# Patient Record
Sex: FEMALE | Race: WHITE | Employment: OTHER | URBAN - METROPOLITAN AREA
[De-identification: names, ages, dates, MRNs, and addresses within clinical notes are randomized per-mention and may not be internally consistent; named-entity substitution may affect disease eponyms.]

---

## 2020-03-13 RX ORDER — CLONAZEPAM 0.5 MG/1
0.25 TABLET ORAL
COMMUNITY

## 2020-03-13 RX ORDER — BUPROPION HYDROCHLORIDE 150 MG/1
150 TABLET ORAL DAILY
COMMUNITY

## 2020-03-13 RX ORDER — ESCITALOPRAM OXALATE 10 MG/1
10 TABLET ORAL DAILY
COMMUNITY

## 2020-03-14 NOTE — H&P
CARE TEAM:  Patient Care Team:  Pramod Garvin MD as PCP - General (Family Medicine)     ASSESSMENT:  1. Closed fracture of olecranon process of left ulna, initial encounter          There is no problem list on file for this patient.       PLAN:  Treatment Plan:  She needs operative fixation. I have recommended a splint in the interim which she has refused. We discussed reasonable expectations. We discussed usual postoperative course. Regional anesthesia.         Orders Placed This Encounter    X-ray elbow left 3+ views ([id number])      Follow-up: Return if symptoms worsen or fail to improve, for first postoperative visit.      HISTORY OF PRESENT ILLNESS:  Chief Complaint: Follow-up of the Left Elbow     Age: 82 y.o. Sex: female   History of present illness: Se Ho  is a pleasant 82 y.o. female who presents today for evaluation of her left elbow. She reports a fall February 20th. Landed onto her elbow. Pain and swelling. She was seen by her primary care physician. X-rays were obtained. She was told she had a fracture. She was not immobilized. She was then seen at ortho on-call February 27th. She was put into a sling. She reports some persistent pain. Some weakness in the arm.     Past medical history, past surgical history, medications, allergies, social history, and review of systems have been reviewed by me.     Review of Systems   3/3/2020    Constitutional: Unexplained: Negative  Genitourinary: Frequent Urination: Negative  HEENT: Vision Loss: Negative  Neurological: Memory Loss: Negative  Integumentary: Rash: Negative  Cardiovascular: Palpatations: Negative  Hematologic: Bruises/Bleeds Easily: Negative  Gastrointestinal: Constipation: Negative  Immunological: Seasonal Allergies: Negative  Musculoskeletal: Joint Pain: Positive        OBJECTIVE:  Constitutional:  No acute distress. Pleasant and cooperative with exam.  HEENT: Normocephalic.  Atraumatic. Eyes are clear  Hearing intact to spoken word   Respiratory:  Breathing unlabored. .  Cardiovascular:  No marked edema. Psychiatric: Alert.  Affect appropriate. Gait: Normal.  Musculoskeletal    Skin is intact. She has significant ecchymosis in the elbow tracking down into the hand. She has surprisingly good elbow motion actively  degrees. Full forearm rotation. intact sensibility     IMAGING / STUDIES:   Order: XR ELBOW 3+ VW LEFT - Indication: Closed fracture of olecranon   process of left ulna, initial encounter        X-ray Elbow Left 3+ Views ([id number])     Result Date: 3/3/2020  AP, Lat, Oblique.      Impression: I ordered and interpreted three views of the elbow. These demonstrate widely displaced olecranon fracture            I independently reviewed x-ray images of the elbow.   She has a displaced olecranon fracture

## 2020-03-16 ENCOUNTER — ANESTHESIA (OUTPATIENT)
Dept: MEDSURG UNIT | Age: 85
End: 2020-03-16
Payer: MEDICARE

## 2020-03-16 ENCOUNTER — HOSPITAL ENCOUNTER (OUTPATIENT)
Age: 85
Setting detail: OUTPATIENT SURGERY
Discharge: HOME | End: 2020-03-16
Attending: ORTHOPAEDIC SURGERY | Admitting: ORTHOPAEDIC SURGERY
Payer: MEDICARE

## 2020-03-16 ENCOUNTER — ANESTHESIA EVENT (OUTPATIENT)
Dept: MEDSURG UNIT | Age: 85
End: 2020-03-16
Payer: MEDICARE

## 2020-03-16 VITALS
HEIGHT: 62 IN | HEART RATE: 64 BPM | BODY MASS INDEX: 20.61 KG/M2 | SYSTOLIC BLOOD PRESSURE: 135 MMHG | OXYGEN SATURATION: 93 % | TEMPERATURE: 97.5 F | WEIGHT: 112 LBS | DIASTOLIC BLOOD PRESSURE: 56 MMHG | RESPIRATION RATE: 16 BRPM

## 2020-03-16 DIAGNOSIS — S52.022A CLOSED FRACTURE OF OLECRANON PROCESS OF LEFT ULNA, INITIAL ENCOUNTER: Primary | ICD-10-CM

## 2020-03-16 PROCEDURE — 77030031139 HC SUT VCRL2 J&J -A: Performed by: ORTHOPAEDIC SURGERY

## 2020-03-16 PROCEDURE — 74011250636 HC RX REV CODE- 250/636: Performed by: NURSE ANESTHETIST, CERTIFIED REGISTERED

## 2020-03-16 PROCEDURE — 76210000034 HC AMBSU PH I REC 0.5 TO 1 HR: Performed by: ORTHOPAEDIC SURGERY

## 2020-03-16 PROCEDURE — 77030040356 HC CORD BPLR FRCP COVD -A: Performed by: ORTHOPAEDIC SURGERY

## 2020-03-16 PROCEDURE — 77030006689 HC BLD OPHTH BVR BD -A: Performed by: ORTHOPAEDIC SURGERY

## 2020-03-16 PROCEDURE — 77030020754 HC CUF TRNQT 2BLA STRY -B: Performed by: ORTHOPAEDIC SURGERY

## 2020-03-16 PROCEDURE — C1713 ANCHOR/SCREW BN/BN,TIS/BN: HCPCS | Performed by: ORTHOPAEDIC SURGERY

## 2020-03-16 PROCEDURE — C1769 GUIDE WIRE: HCPCS | Performed by: ORTHOPAEDIC SURGERY

## 2020-03-16 PROCEDURE — 77030002916 HC SUT ETHLN J&J -A: Performed by: ORTHOPAEDIC SURGERY

## 2020-03-16 PROCEDURE — 76060000061 HC AMB SURG ANES 0.5 TO 1 HR: Performed by: ORTHOPAEDIC SURGERY

## 2020-03-16 PROCEDURE — 74011250636 HC RX REV CODE- 250/636: Performed by: ANESTHESIOLOGY

## 2020-03-16 PROCEDURE — 74011250636 HC RX REV CODE- 250/636: Performed by: ORTHOPAEDIC SURGERY

## 2020-03-16 PROCEDURE — 77030039497 HC CST PAD STERILE CHCS -A: Performed by: ORTHOPAEDIC SURGERY

## 2020-03-16 PROCEDURE — 77030018836 HC SOL IRR NACL ICUM -A: Performed by: ORTHOPAEDIC SURGERY

## 2020-03-16 PROCEDURE — 77030040922 HC BLNKT HYPOTHRM STRY -A

## 2020-03-16 PROCEDURE — 77030040361 HC SLV COMPR DVT MDII -B: Performed by: ORTHOPAEDIC SURGERY

## 2020-03-16 PROCEDURE — 77030003737 HC BIT DRL ACMD -C: Performed by: ORTHOPAEDIC SURGERY

## 2020-03-16 PROCEDURE — 76030000000 HC AMB SURG OR TIME 0.5 TO 1: Performed by: ORTHOPAEDIC SURGERY

## 2020-03-16 DEVICE — OLECRANON PLATE, EXTENDED 5-HOLE LT
Type: IMPLANTABLE DEVICE | Site: ELBOW | Status: FUNCTIONAL
Brand: ACUMED

## 2020-03-16 DEVICE — 2.7MM X 16MM LOCKING HEXALOBE SCREW
Type: IMPLANTABLE DEVICE | Site: ELBOW | Status: FUNCTIONAL
Brand: ACUMED

## 2020-03-16 DEVICE — 3.5MM X 14MM NON-LOCKING HEXALOBE SCREW
Type: IMPLANTABLE DEVICE | Site: ELBOW | Status: FUNCTIONAL
Brand: ACUMED

## 2020-03-16 DEVICE — 3.5MM X 20MM LOCKING HEXALOBE SCREW
Type: IMPLANTABLE DEVICE | Site: ELBOW | Status: FUNCTIONAL
Brand: ACUMED

## 2020-03-16 DEVICE — 3.5MM X 14MM LOCKING HEXALOBE SCREW
Type: IMPLANTABLE DEVICE | Site: ELBOW | Status: FUNCTIONAL
Brand: ACUMED

## 2020-03-16 DEVICE — 3.5MM X 12MM LOCKING HEXALOBE SCREW
Type: IMPLANTABLE DEVICE | Site: ELBOW | Status: FUNCTIONAL
Brand: ACUMED

## 2020-03-16 DEVICE — 2.7MM X 20MM LOCKING HEXALOBE SCREW
Type: IMPLANTABLE DEVICE | Site: ELBOW | Status: FUNCTIONAL
Brand: ACUMED

## 2020-03-16 DEVICE — 3.5MM X 40MM LOCKING HEXALOBE SCREW
Type: IMPLANTABLE DEVICE | Site: ELBOW | Status: FUNCTIONAL
Brand: ACUMED

## 2020-03-16 DEVICE — 3.5MM X 20MM NON-LOCKING HEXALOBE SCREW
Type: IMPLANTABLE DEVICE | Site: ELBOW | Status: FUNCTIONAL
Brand: ACUMED

## 2020-03-16 RX ORDER — ROPIVACAINE HYDROCHLORIDE 5 MG/ML
INJECTION, SOLUTION EPIDURAL; INFILTRATION; PERINEURAL
Status: COMPLETED | OUTPATIENT
Start: 2020-03-16 | End: 2020-03-16

## 2020-03-16 RX ORDER — FENTANYL CITRATE 50 UG/ML
25 INJECTION, SOLUTION INTRAMUSCULAR; INTRAVENOUS
Status: DISCONTINUED | OUTPATIENT
Start: 2020-03-16 | End: 2020-03-18 | Stop reason: HOSPADM

## 2020-03-16 RX ORDER — SODIUM CHLORIDE 0.9 % (FLUSH) 0.9 %
5-40 SYRINGE (ML) INJECTION AS NEEDED
Status: DISCONTINUED | OUTPATIENT
Start: 2020-03-16 | End: 2020-03-16 | Stop reason: HOSPADM

## 2020-03-16 RX ORDER — SODIUM CHLORIDE 0.9 % (FLUSH) 0.9 %
5-40 SYRINGE (ML) INJECTION EVERY 8 HOURS
Status: DISCONTINUED | OUTPATIENT
Start: 2020-03-16 | End: 2020-03-16 | Stop reason: HOSPADM

## 2020-03-16 RX ORDER — SODIUM CHLORIDE 0.9 % (FLUSH) 0.9 %
5-40 SYRINGE (ML) INJECTION EVERY 8 HOURS
Status: DISCONTINUED | OUTPATIENT
Start: 2020-03-16 | End: 2020-03-18 | Stop reason: HOSPADM

## 2020-03-16 RX ORDER — HYDROCODONE BITARTRATE AND ACETAMINOPHEN 5; 325 MG/1; MG/1
1 TABLET ORAL
Qty: 12 TAB | Refills: 0 | Status: SHIPPED | OUTPATIENT
Start: 2020-03-16 | End: 2020-03-19

## 2020-03-16 RX ORDER — MORPHINE SULFATE 10 MG/ML
2 INJECTION, SOLUTION INTRAMUSCULAR; INTRAVENOUS
Status: DISCONTINUED | OUTPATIENT
Start: 2020-03-16 | End: 2020-03-18 | Stop reason: HOSPADM

## 2020-03-16 RX ORDER — SODIUM CHLORIDE 0.9 % (FLUSH) 0.9 %
5-40 SYRINGE (ML) INJECTION AS NEEDED
Status: DISCONTINUED | OUTPATIENT
Start: 2020-03-16 | End: 2020-03-18 | Stop reason: HOSPADM

## 2020-03-16 RX ORDER — FENTANYL CITRATE 50 UG/ML
50 INJECTION, SOLUTION INTRAMUSCULAR; INTRAVENOUS AS NEEDED
Status: DISCONTINUED | OUTPATIENT
Start: 2020-03-16 | End: 2020-03-16 | Stop reason: HOSPADM

## 2020-03-16 RX ORDER — PROPOFOL 10 MG/ML
INJECTION, EMULSION INTRAVENOUS AS NEEDED
Status: DISCONTINUED | OUTPATIENT
Start: 2020-03-16 | End: 2020-03-16 | Stop reason: HOSPADM

## 2020-03-16 RX ORDER — MIDAZOLAM HYDROCHLORIDE 1 MG/ML
1 INJECTION, SOLUTION INTRAMUSCULAR; INTRAVENOUS AS NEEDED
Status: DISCONTINUED | OUTPATIENT
Start: 2020-03-16 | End: 2020-03-16 | Stop reason: HOSPADM

## 2020-03-16 RX ORDER — SODIUM CHLORIDE, SODIUM LACTATE, POTASSIUM CHLORIDE, CALCIUM CHLORIDE 600; 310; 30; 20 MG/100ML; MG/100ML; MG/100ML; MG/100ML
INJECTION, SOLUTION INTRAVENOUS
Status: DISCONTINUED | OUTPATIENT
Start: 2020-03-16 | End: 2020-03-16 | Stop reason: HOSPADM

## 2020-03-16 RX ORDER — LIDOCAINE HYDROCHLORIDE 10 MG/ML
0.1 INJECTION, SOLUTION EPIDURAL; INFILTRATION; INTRACAUDAL; PERINEURAL AS NEEDED
Status: DISCONTINUED | OUTPATIENT
Start: 2020-03-16 | End: 2020-03-16 | Stop reason: HOSPADM

## 2020-03-16 RX ORDER — PROPOFOL 10 MG/ML
INJECTION, EMULSION INTRAVENOUS
Status: DISCONTINUED | OUTPATIENT
Start: 2020-03-16 | End: 2020-03-16 | Stop reason: HOSPADM

## 2020-03-16 RX ORDER — ROPIVACAINE HYDROCHLORIDE 5 MG/ML
30 INJECTION, SOLUTION EPIDURAL; INFILTRATION; PERINEURAL AS NEEDED
Status: DISCONTINUED | OUTPATIENT
Start: 2020-03-16 | End: 2020-03-16 | Stop reason: HOSPADM

## 2020-03-16 RX ORDER — CEFAZOLIN SODIUM/WATER 2 G/20 ML
2 SYRINGE (ML) INTRAVENOUS ONCE
Status: COMPLETED | OUTPATIENT
Start: 2020-03-16 | End: 2020-03-16

## 2020-03-16 RX ORDER — HYDROMORPHONE HYDROCHLORIDE 1 MG/ML
0.2 INJECTION, SOLUTION INTRAMUSCULAR; INTRAVENOUS; SUBCUTANEOUS
Status: DISCONTINUED | OUTPATIENT
Start: 2020-03-16 | End: 2020-03-18 | Stop reason: HOSPADM

## 2020-03-16 RX ORDER — SODIUM CHLORIDE, SODIUM LACTATE, POTASSIUM CHLORIDE, CALCIUM CHLORIDE 600; 310; 30; 20 MG/100ML; MG/100ML; MG/100ML; MG/100ML
50 INJECTION, SOLUTION INTRAVENOUS CONTINUOUS
Status: DISCONTINUED | OUTPATIENT
Start: 2020-03-16 | End: 2020-03-16 | Stop reason: HOSPADM

## 2020-03-16 RX ORDER — ONDANSETRON 2 MG/ML
4 INJECTION INTRAMUSCULAR; INTRAVENOUS AS NEEDED
Status: DISCONTINUED | OUTPATIENT
Start: 2020-03-16 | End: 2020-03-18 | Stop reason: HOSPADM

## 2020-03-16 RX ADMIN — ROPIVACAINE HYDROCHLORIDE 30 ML: 5 INJECTION, SOLUTION EPIDURAL; INFILTRATION; PERINEURAL at 12:35

## 2020-03-16 RX ADMIN — Medication 2 G: at 13:33

## 2020-03-16 RX ADMIN — SODIUM CHLORIDE, POTASSIUM CHLORIDE, SODIUM LACTATE AND CALCIUM CHLORIDE: 600; 310; 30; 20 INJECTION, SOLUTION INTRAVENOUS at 13:31

## 2020-03-16 RX ADMIN — MIDAZOLAM 2 MG: 1 INJECTION INTRAMUSCULAR; INTRAVENOUS at 12:51

## 2020-03-16 RX ADMIN — SODIUM CHLORIDE, SODIUM LACTATE, POTASSIUM CHLORIDE, AND CALCIUM CHLORIDE 50 ML/HR: 600; 310; 30; 20 INJECTION, SOLUTION INTRAVENOUS at 12:04

## 2020-03-16 RX ADMIN — PROPOFOL 25 MCG/KG/MIN: 10 INJECTION, EMULSION INTRAVENOUS at 13:37

## 2020-03-16 RX ADMIN — PROPOFOL 20 MG: 10 INJECTION, EMULSION INTRAVENOUS at 13:39

## 2020-03-16 RX ADMIN — FENTANYL CITRATE 50 MCG: 50 INJECTION INTRAMUSCULAR; INTRAVENOUS at 12:51

## 2020-03-16 RX ADMIN — PROPOFOL 20 MG: 10 INJECTION, EMULSION INTRAVENOUS at 13:37

## 2020-03-16 NOTE — ANESTHESIA PREPROCEDURE EVALUATION
Relevant Problems   No relevant active problems       Anesthetic History   No history of anesthetic complications            Review of Systems / Medical History  Patient summary reviewed, nursing notes reviewed and pertinent labs reviewed    Pulmonary  Within defined limits                 Neuro/Psych   Within defined limits           Cardiovascular                  Exercise tolerance: >4 METS     GI/Hepatic/Renal                Endo/Other  Within defined limits           Other Findings              Physical Exam    Airway  Mallampati: I           Cardiovascular    Rhythm: regular  Rate: normal         Dental  No notable dental hx       Pulmonary  Breath sounds clear to auscultation               Abdominal         Other Findings            Anesthetic Plan    ASA: 2  Anesthesia type: regional and MAC - supraclavicular block          Induction: Intravenous  Anesthetic plan and risks discussed with: Patient

## 2020-03-16 NOTE — ANESTHESIA POSTPROCEDURE EVALUATION
Post-Anesthesia Evaluation and Assessment    Patient: Carlos Alvarado MRN: [id number]  SSN: [id number]    YOB: 1935  Age: 82 y.o. Sex: female      I have evaluated the patient and they are stable and ready for discharge from the PACU. Cardiovascular Function/Vital Signs  Visit Vitals  /61   Pulse 62   Temp 36.4 °C (97.5 °F)   Resp 15   Ht 5' 2\" (1.575 m)   Wt 50.8 kg (112 lb)   SpO2 96%   BMI 20.49 kg/m²       Patient is status post Regional anesthesia for Procedure(s):  OPEN REDUCTION INTERNAL FIXATION LEFT OLECRANON FRACTURE. Nausea/Vomiting: None    Postoperative hydration reviewed and adequate. Pain:  Pain Scale 1: Numeric (0 - 10) (03/16/20 1445)  Pain Intensity 1: 0 (03/16/20 1445)   Managed    Neurological Status:   Neuro (WDL): Within Defined Limits (03/16/20 1445)   At baseline    Mental Status, Level of Consciousness: Alert and  oriented to person, place, and time    Pulmonary Status:   O2 Device: Nasal cannula (03/16/20 1445)   Adequate oxygenation and airway patent    Complications related to anesthesia: None    Post-anesthesia assessment completed. No concerns    Signed By: Elvi Dahl MD     March 16, 2020              Procedure(s):  OPEN REDUCTION INTERNAL FIXATION LEFT OLECRANON FRACTURE.    regional, MAC    <BSHSIANPOST>    Vitals Value Taken Time   /56 3/16/2020  3:00 PM   Temp 36.4 °C (97.5 °F) 3/16/2020  2:21 PM   Pulse 64 3/16/2020  3:03 PM   Resp 22 3/16/2020  3:03 PM   SpO2 95 % 3/16/2020  3:03 PM   Vitals shown include unvalidated device data.

## 2020-03-16 NOTE — ANESTHESIA PROCEDURE NOTES
Peripheral Block    Start time: 3/16/2020 12:30 PM  End time: 3/16/2020 12:40 PM  Performed by: Janeth Hernandez MD  Authorized by: Janeth Hernandez MD       Pre-procedure:    Indications: at surgeon's request    Preanesthetic Checklist: patient identified, risks and benefits discussed, site marked, timeout performed, anesthesia consent given and patient being monitored    Timeout Time: 12:30          Block Type:   Block Type:  Supraclavicular  Laterality:  Left  Monitoring:  Oxygen, responsive to questions, standard ASA monitoring, continuous pulse ox, frequent vital sign checks and heart rate  Injection Technique:  Single shot  Procedures: ultrasound guided    Patient Position: supine  Prep: chlorhexidine    Location:  Supraclavicular  Needle Type:  Stimuplex  Needle Gauge:  21 G  Needle Localization:  Ultrasound guidance    Assessment:  Number of attempts:  1  Injection Assessment:  Ultrasound image on chart, no paresthesia, incremental injection every 5 mL, negative aspiration for CSF, low pressure verified by pressure monitor, no intravascular symptoms, negative aspiration for blood and local visualized surrounding nerve on ultrasound  Patient tolerance:  Patient tolerated the procedure well with no immediate complications

## 2020-03-16 NOTE — ROUTINE PROCESS
Patient: Enrike Willson MRN: [id number]  SSN: [id number]   YOB: 1935  Age: 82 y.o. Sex: female     Patient is status post Procedure(s):  OPEN REDUCTION INTERNAL FIXATION LEFT OLECRANON FRACTURE. Surgeon(s) and Role:     * Markus Ca MD - Primary    Local/Dose/Irrigation:  SEE MAR                  Peripheral IV 03/16/20 Right Hand (Active)   Site Assessment Clean, dry, & intact 3/16/2020 12:03 PM   Phlebitis Assessment 0 3/16/2020 12:03 PM   Infiltration Assessment 0 3/16/2020 12:03 PM   Dressing Status Clean, dry, & intact 3/16/2020 12:03 PM   Dressing Type Tape;Transparent 3/16/2020 12:03 PM   Hub Color/Line Status Pink; Infusing 3/16/2020 12:03 PM   Alcohol Cap Used Yes 3/16/2020 12:03 PM                           Dressing/Packing:  Wound Elbow Left-Dressing Type: 4 x 4;ABD pad;Cast padding;Elastic bandage; Xeroform (03/16/20 1355)    Splint/Cast: Wound Elbow Left-Splint Type/Material: Splint, plaster]    Other:

## 2020-03-16 NOTE — OP NOTES
[address]   OPERATIVE REPORT    Name:  Leydi Abreu  MR#:  [id number]  :  1935  ACCOUNT #:  [de-identified]  DATE OF SERVICE:  2020      PREOPERATIVE DIAGNOSIS:  Closed displaced left olecranon fracture. POSTOPERATIVE DIAGNOSIS:  Closed displaced left olecranon fracture. PROCEDURE PERFORMED:  Open reduction and internal fixation of closed displaced left olecranon fracture. SURGEON:  Becka Camp MD    ASSISTANT:  None    ANESTHESIA:  Regional.    COMPLICATIONS:  None. SPECIMENS REMOVED:  None. IMPLANTS:  Acumed locking olecranon plate. ESTIMATED BLOOD LOSS:  Minimal.    INDICATIONS FOR THE PROCEDURE:  This is an 80-year-old female with a displaced olecranon fracture. She is indicated for operative management. We have discussed risks, benefits, potential complications and alternatives of surgery, and she has given informed consent to proceed. DESCRIPTION OF THE PROCEDURE:  The patient was identified in the preoperative holding area. Informed consent was obtained. The operative site was marked. Regional anesthesia was then established. She was then transported to the OR and placed supine on the operating table. A bump was placed under the left shoulder to allow the arm to drape across her chest.  A tourniquet was applied to the upper brachium. The left upper extremity was then sterilely prepped and draped in the usual fashion. Surgical time-out was held. The operative site was confirmed. Preop antibiotics were used. After Esmarch exsanguination, the tourniquet was elevated to 250 mmHg. A longitudinal incision was made posteriorly over the olecranon. Large skin flaps were raised. The fracture was easily identified. She was found to have a large well-organized hematoma. This was evacuated. The fracture was gently debrided with a curette. The fracture was then reduced with a pointed reduction clamp and held in place with multiple K-wires.   A locking olecranon plate was then applied posteriorly. This was held in place provisionally with a single cortical screw distally. Fluoroscopy was brought in and confirmed good reduction of the fracture and good alignment of the hardware. Multiple locking screws were placed proximally, and multiple locking and nonlocking screws were placed distally. The K-wires were removed. All other instrumentation was removed. The elbow and forearm were taken through a full range of motion without crepitus or impingement. The wound was thoroughly irrigated. Fluoroscopy confirmed acceptable reduction of the fracture and good alignment of the hardware. The fascia was closed with an 0-Vicryl over the plate. Skin was closed with 3-0 Vicryl and 4-0 nylon. Sterile dressings were applied. A well-padded, well-molded long-arm splint was applied. The tourniquet was let down and brisk cap refill returned to the digits. She was transferred to the PACU in stable condition without complication.                     Agapito Machado MD      [id number]  D:  03/16/2020 14:21  T:  03/16/2020 15:27  JOB #:  [id number]

## 2020-03-16 NOTE — BRIEF OP NOTE
BRIEF OPERATIVE NOTE    Date of Procedure: 3/16/2020   Preoperative Diagnosis: LEFT OLECRANON FRACTURE  Postoperative Diagnosis: LEFT OLECRANON FRACTURE    Procedure(s):  OPEN REDUCTION INTERNAL FIXATION LEFT OLECRANON FRACTURE  Surgeon(s) and Role:     * Court Jesus MD - Primary         Surgical Assistant: none    Surgical Staff:  Circ-1: Emely Hassan RN  Registered Nurse Assistant: Lisa Layne RN  Scrub Tech-1: Adolph Ortega  Event Time In   Incision Start 1343   Incision Close 1412     Anesthesia: Regional   Estimated Blood Loss: min  Specimens: * No specimens in log *   Findings: displaced olecranon fracture   Complications: none  Implants:   Implant Name Type Inv.  Item Serial No.  Lot No. LRB No. Used Action   PLATE OLECRN EXTD Dianelys Nathan Manpreet [id number] LT --  - SNA  PLATE OLECRN EXTD Dianelys Nathan Manpreet 1636 LT --  NA [address] [place] [address] Drive NA Left 1 Implanted   SCR BNE LCK HEXALOBE 2.7X16MM -- F/ELBOW PLT SYS - SNA  SCR BNE LCK HEXALOBE 2.7X16MM -- F/ELBOW PLT SYS NA [employer] NA Left 1 Implanted   SCR BNE LCK HEXALOBE 2.7X20MM -- F/ELBOW PLT SYS - SNA  SCR BNE LCK HEXALOBE 2.7X20MM -- F/ELBOW PLT SYS NA [employer] NA Left 1 Implanted   SCR BNE NLCK HEXALOBE 3.5X14MM -- F/ELBOW PLT SYS - SNA  SCR BNE NLCK HEXALOBE 3.5X14MM -- F/ELBOW PLT SYS NA [employer] NA Left 1 Implanted   SCR BNE NLCK HEXALOBE 3.5X20MM -- F/ELBOW PLT SYS - SNA  SCR BNE NLCK HEXALOBE 3.5X20MM -- F/ELBOW PLT SYS NA [employer] NA Left 1 Implanted   SCR BNE LCK HEXALOBE 3.5X12MM -- F/ELBOW PLT SYS - SNA  SCR BNE LCK HEXALOBE 3.5X12MM -- F/ELBOW PLT SYS NA [employer] NA Left 1 Implanted   SCR BNE LCK HEXALOBE 3.5X14MM -- F/ELBOW PLT SYS - SNA  SCR BNE LCK HEXALOBE 3.5X14MM -- F/ELBOW PLT SYS NA [employer] NA Left 1 Implanted   SCR BNE LCK HEXALOBE 3.5X20MM -- F/ELBOW PLT SYS - SNA  SCR BNE LCK HEXALOBE 3.5X20MM -- F/ELBOW PLT SYS NA [employer] NA Left 1 Implanted   SCR BNE LCK HEXALOBE 3.5X40MM -- F/ELBOW PLT SYS - SNA  SCR BNE LCK HEXALOBE 3.5X40MM -- F/ELBOW PLT SYS NA [facility] NA Left 1 Implanted

## 2022-06-10 ENCOUNTER — OFFICE VISIT (OUTPATIENT)
Dept: ORTHOPEDIC SURGERY | Age: 87
End: 2022-06-10
Payer: MEDICARE

## 2022-06-10 VITALS — BODY MASS INDEX: 21.53 KG/M2 | HEIGHT: 62 IN | WEIGHT: 117 LBS

## 2022-06-10 DIAGNOSIS — M79.672 PAIN OF LEFT HEEL: ICD-10-CM

## 2022-06-10 DIAGNOSIS — M72.2 PLANTAR FASCIITIS OF LEFT FOOT: Primary | ICD-10-CM

## 2022-06-10 PROCEDURE — 1101F PT FALLS ASSESS-DOCD LE1/YR: CPT | Performed by: ORTHOPAEDIC SURGERY

## 2022-06-10 PROCEDURE — 1123F ACP DISCUSS/DSCN MKR DOCD: CPT | Performed by: ORTHOPAEDIC SURGERY

## 2022-06-10 PROCEDURE — G8400 PT W/DXA NO RESULTS DOC: HCPCS | Performed by: ORTHOPAEDIC SURGERY

## 2022-06-10 PROCEDURE — 99202 OFFICE O/P NEW SF 15 MIN: CPT | Performed by: ORTHOPAEDIC SURGERY

## 2022-06-10 PROCEDURE — G8432 DEP SCR NOT DOC, RNG: HCPCS | Performed by: ORTHOPAEDIC SURGERY

## 2022-06-10 PROCEDURE — G8427 DOCREV CUR MEDS BY ELIG CLIN: HCPCS | Performed by: ORTHOPAEDIC SURGERY

## 2022-06-10 PROCEDURE — G8536 NO DOC ELDER MAL SCRN: HCPCS | Performed by: ORTHOPAEDIC SURGERY

## 2022-06-10 PROCEDURE — G8420 CALC BMI NORM PARAMETERS: HCPCS | Performed by: ORTHOPAEDIC SURGERY

## 2022-06-10 PROCEDURE — 1090F PRES/ABSN URINE INCON ASSESS: CPT | Performed by: ORTHOPAEDIC SURGERY

## 2022-06-10 RX ORDER — METHYLPREDNISOLONE 4 MG/1
TABLET ORAL
Qty: 1 DOSE PACK | Refills: 0 | Status: SHIPPED | OUTPATIENT
Start: 2022-06-10 | End: 2022-06-22

## 2022-06-10 NOTE — LETTER
6/13/2022    Patient: Mark Anthony Code   YOB: 1935   Date of Visit: 6/10/2022     Delano Grant MD  [address] [address]  Via Fax: [phone]    Dear Delano Grant MD,      Thank you for referring Ms. Jonna Berry to [facility] for evaluation. My notes for this consultation are attached. If you have questions, please do not hesitate to call me. I look forward to following your patient along with you.       Sincerely,    Michaela Stahl MD

## 2022-06-10 NOTE — PROGRESS NOTES
Rena Joseph (: 1935) is a 82 y.o. female, patient,here for evaluation of the following   Chief Complaint   Patient presents with    Foot Pain     left foot heel pain        ASSESSMENT/PLAN:  Below is the assessment and plan developed based on review of pertinent history, physical exam, labs, studies, and medications. 1. Plantar fasciitis of left foot  2. Pain of left heel  -     XR FOOT LT MIN 3 V; Future    Examination consistent with left Planter fasciitis, we discussed treatment for this condition. We will start with stretching program as demonstrated today in clinic, provided handout of information about types of exercises to do and how to do them correctly. Recommended physical therapy but patient wants to do on her own. I provided information about types of shoes and insoles to try, advised about appropriate shoe wear which can make a difference in terms of coverage especially if she has to stand and walk often. She did elect to proceed with anti-inflammatory medication treatment with Medrol pack which was sent to her pharmacy. No current surgical indications. Return if symptoms worsen or fail to improve. No Known Allergies    Current Outpatient Medications   Medication Sig    methylPREDNISolone (MEDROL DOSEPACK) 4 mg tablet Take 1 Tablet by mouth Specific Days and Specific Times for 6 days, THEN 1 Tablet Specific Days and Specific Times for 6 days. As instructed on packet    escitalopram oxalate (LEXAPRO) 10 mg tablet Take 10 mg by mouth daily.  buPROPion XL (WELLBUTRIN XL) 150 mg tablet Take 150 mg by mouth daily.  clonazePAM (KlonoPIN) 0.5 mg tablet Take 0.25 mg by mouth nightly. No current facility-administered medications for this visit. Past Medical History:   Diagnosis Date    Fracture of left olecranon process 2020       No past surgical history on file. No family history on file.     Social History     Socioeconomic History    Marital status:      Spouse name: Not on file    Number of children: Not on file    Years of education: Not on file    Highest education level: Not on file   Occupational History    Not on file   Tobacco Use    Smoking status: Never Smoker    Smokeless tobacco: Never Used   Substance and Sexual Activity    Alcohol use: Yes     Alcohol/week: 14.0 standard drinks     Types: 14 Glasses of wine per week    Drug use: Never    Sexual activity: Not on file   Other Topics Concern    Not on file   Social History Narrative    Not on file     Social Determinants of Health     Financial Resource Strain:     Difficulty of Paying Living Expenses: Not on file   Food Insecurity:     Worried About Running Out of Food in the Last Year: Not on file    Kadeem of Food in the Last Year: Not on file   Transportation Needs:     Lack of Transportation (Medical): Not on file    Lack of Transportation (Non-Medical):  Not on file   Physical Activity:     Days of Exercise per Week: Not on file    Minutes of Exercise per Session: Not on file   Stress:     Feeling of Stress : Not on file   Social Connections:     Frequency of Communication with Friends and Family: Not on file    Frequency of Social Gatherings with Friends and Family: Not on file    Attends Jainism Services: Not on file    Active Member of [address] [place] [address] [employer] or Organizations: Not on file    Attends Club or Organization Meetings: Not on file    Marital Status: Not on file   Intimate Partner Violence:     Fear of Current or Ex-Partner: Not on file    Emotionally Abused: Not on file    Physically Abused: Not on file    Sexually Abused: Not on file   Housing Stability:     Unable to Pay for Housing in the Last Year: Not on file    Number of [place] in the Last Year: Not on file    Unstable Housing in the Last Year: Not on file           Vitals:  Ht 5' 2\" (1.575 m)   Wt 117 lb (53.1 kg)   BMI 21.40 kg/m²    Body mass index is 21.4 kg/m².            SUBJECTIVE/OBJECTIVE:  Joslyn Mcmahon (: 1935)   Patient presents today with complaint of left heel pain present for about 2 months now, and pain is severe stabbing pain that appears constant although worse with first step getting out of bed or from his seat for after being seated for some time. Maybe a little bit of swelling to the area at times but cannot really tell. Symptoms unchanged has not tried any significant treatments. Not diabetic, non-smoker. Physical Exam  Pleasant well-nourished female , alert and oriented to person, time and place, no acute distress. Mild antalgic gait, satisfactory weightbearing stance. Left Lower Extremity: FROM actively of toes, foot, ankle, knee and hip. No instability of foot or ankle with drawer and stress. 55 strength to TAEHLGSCPeronealsPTib. No skin lesions. TTP at the medial calcaneal insertion of the plantar fascia. This spot is very painful. Silverskoid exam is Positive for gastric tightness. Calcaneal squeeze test, negative Tinel's along tarsal tunnel. Contralateral foot and ankle exam, nontender, no swelling ligaments grossly stable. Normal weightbearing stance. Neurovascular exam intact for light touch sensation, cap refill, dorsalis pedis pulse palpable, flexion/extension strength 5/5. Skin intact without open wounds, lesions or ulcers, no skin discolorations, normal warmth to skin. Imaging:    XR Results (most recent):  Results from Appointment encounter on 06/10/22    XR FOOT LT MIN 3 V    Narrative  Left foot AP, lateral and oblique nonweightbearing x-rays show no acute fractures or dislocations, there is decreased bone density but no soft tissue swelling. No significant bone spurs. An electronic signature was used to authenticate this note.   -- Aleene Apgar, MD

## 2022-06-27 ENCOUNTER — TELEPHONE (OUTPATIENT)
Dept: ORTHOPEDIC SURGERY | Age: 87
End: 2022-06-27

## 2022-06-27 DIAGNOSIS — M79.672 PAIN OF LEFT HEEL: ICD-10-CM

## 2022-06-27 DIAGNOSIS — M72.2 PLANTAR FASCIITIS OF LEFT FOOT: Primary | ICD-10-CM

## 2022-07-06 ENCOUNTER — TELEPHONE (OUTPATIENT)
Dept: ORTHOPEDIC SURGERY | Age: 87
End: 2022-07-06

## 2022-07-06 NOTE — TELEPHONE ENCOUNTER
Pt called for Physical therapy request advised Dr. Alicia Martinez is  OOO and I will call her back when she returns with the information.  Called back 7/7 advised PT request has been submitted

## 2022-07-07 DIAGNOSIS — M72.2 PLANTAR FASCIITIS OF LEFT FOOT: Primary | ICD-10-CM

## 2022-07-14 ENCOUNTER — OFFICE VISIT (OUTPATIENT)
Dept: ORTHOPEDIC SURGERY | Age: 87
End: 2022-07-14
Payer: MEDICARE

## 2022-07-14 DIAGNOSIS — M72.2 PLANTAR FASCIITIS OF LEFT FOOT: ICD-10-CM

## 2022-07-14 DIAGNOSIS — M79.672 PAIN OF LEFT HEEL: Primary | ICD-10-CM

## 2022-07-14 PROCEDURE — 97140 MANUAL THERAPY 1/> REGIONS: CPT | Performed by: PHYSICAL THERAPIST

## 2022-07-14 PROCEDURE — 97161 PT EVAL LOW COMPLEX 20 MIN: CPT | Performed by: PHYSICAL THERAPIST

## 2022-07-14 NOTE — PROGRESS NOTES
Patient Initial Evaluation    Patient Name: Lisa Malhotra  Date:2022  : 1935  [x]  Patient  Verified  Payor: [place] MEDICARE / Plan: [place] MEDICARE PART A & B / Product Type: Medicare /    Total Treatment Time (min): 40    Treatment Area: L foot    HPI    The patient is a 82-year-old female referred to physical therapy by Dr. ISBELL [facility] with a diagnosis of left plantar fasciitis. The patient states that she has had left foot and heel pain for the last year. It has significantly worsened within the last few months. Patient states that her primary limitation is that it is very painful to walk. The only tolerable shoes to wear are her sandals. She is not wearing any inserts. She states that all her shoes are too painful on her heel. She states that she just finished taking a Medrol Dosepak and it did not alleviate any of her symptoms. She has not been doing any formal stretches or exercises. Patient's primary goal is to alleviate heel pain when walking. OBJECTIVE    Gait: Patient ambulates with antalgic gait pattern with decreased left heel strike at initial stance of gait. Range of motion: R ankle WNL; L ankle active DF- neutral, passive-5, L ankle active PF- 25, passive- 30; active Ev- 10, active inv- 15    Strength: R LE WNL, L ankle grossly 4/5 except L PF 3+/5 and painful     Palpation: Tenderness with palpation to left calcaneal tuberosity and left distal achilles tendon    Soft tissue: Decreased left gastroc muscle length    Pain: Reported a visual analog scale 0/10 at rest    Swelling: moderate of L ankle    Special tests: Bernabe (+)    Treatment:    Initial evaluation completed. Manual (minutes: 15): Soft tissue mobilization of L Achilles and plantar fascia. Grade 3 midfoot mobilizations. Grade 3L posterior talocrural mobilization. Manual L gastroc stretch. We discussed at length progressive plan of care and goals with the patient to develop current plan of care.  We initiated ROM and gentle stretching and strengthening interventions. Home exercise program included seated L gastroc stretch, ankle pumps, ankle circles, plantar fascia stretch with water bottle, and standing gastroc stretch. We recommended utilization of ice for pain relief at home. Written and pictorial exercises were provided to the patient. Total treatment time 40 min. ASSESSMENT    Physical examination reveals L ankle AROM and PROM limitations, talocrural and midfoot hypomobility, decreased L ankle PF strength, tenderness to palpation to L calcaneal tuberosity, and decreased functional mobility. Patient will benefit from skilled therapy to address his limitations. Long-term goal 4 weeks  1. Patient will ambulate with nonantalgic gait pattern with equal step lengths and demonstrate left heel strike at initial contact. 2.  The patient will demonstrate L active ankle DF to 20°. 3.  The patient will walk for 10 minutes without reproduction of left heel pain. 4.  15 point improvement in FAAM.    Short-term goal 1 weeks  1. Independent demonstration of a home exercise program to facilitate recovery. Total treatment time today 40 min. ICD-10-CM ICD-9-CM    1. Pain of left heel  M79.672 729.5    2. Plantar fasciitis of left foot  M72.2 728.71      PLAN OF CARE:    Treatment frequency 1X weekly. Duration 20 visits. Focus of therapy will be on progressive restoration of range of motion and strength, balance, and functional mobility. Therapeutic applications will include but are not limited to:  Home exercise program development and implementation with updating as needed. Intramuscular dry needling to the involved region. Manual therapy, joint mobilization, myofascial release, therapeutic exercises. Modalities including ultrasound and electric stimulation heat and ice. Kinesiotape and Laura taping for joint reeducation and approximation of tissue for neuromuscular reeducation.

## 2022-07-15 ENCOUNTER — OFFICE VISIT (OUTPATIENT)
Dept: ORTHOPEDIC SURGERY | Age: 87
End: 2022-07-15
Payer: MEDICARE

## 2022-07-15 VITALS — HEIGHT: 62 IN | BODY MASS INDEX: 21.53 KG/M2 | WEIGHT: 117 LBS

## 2022-07-15 DIAGNOSIS — M72.2 PLANTAR FASCIITIS OF LEFT FOOT: Primary | ICD-10-CM

## 2022-07-15 PROCEDURE — G8400 PT W/DXA NO RESULTS DOC: HCPCS | Performed by: ORTHOPAEDIC SURGERY

## 2022-07-15 PROCEDURE — 1123F ACP DISCUSS/DSCN MKR DOCD: CPT | Performed by: ORTHOPAEDIC SURGERY

## 2022-07-15 PROCEDURE — G8427 DOCREV CUR MEDS BY ELIG CLIN: HCPCS | Performed by: ORTHOPAEDIC SURGERY

## 2022-07-15 PROCEDURE — G8420 CALC BMI NORM PARAMETERS: HCPCS | Performed by: ORTHOPAEDIC SURGERY

## 2022-07-15 PROCEDURE — 1090F PRES/ABSN URINE INCON ASSESS: CPT | Performed by: ORTHOPAEDIC SURGERY

## 2022-07-15 PROCEDURE — G8536 NO DOC ELDER MAL SCRN: HCPCS | Performed by: ORTHOPAEDIC SURGERY

## 2022-07-15 PROCEDURE — G8432 DEP SCR NOT DOC, RNG: HCPCS | Performed by: ORTHOPAEDIC SURGERY

## 2022-07-15 PROCEDURE — 1101F PT FALLS ASSESS-DOCD LE1/YR: CPT | Performed by: ORTHOPAEDIC SURGERY

## 2022-07-15 PROCEDURE — 99212 OFFICE O/P EST SF 10 MIN: CPT | Performed by: ORTHOPAEDIC SURGERY

## 2022-07-15 NOTE — LETTER
7/18/2022    Patient: Tamia Rao   YOB: 1935   Date of Visit: 7/15/2022     Henrik Pierre MD  [address] [facility] [address]  Via Fax: [phone]    Dear Henrik Pierre MD,      Thank you for referring Ms. Isidoro Elizabeth to [facility] for evaluation. My notes for this consultation are attached. If you have questions, please do not hesitate to call me. I look forward to following your patient along with you.       Sincerely,    Miguel Martini MD

## 2022-07-18 NOTE — PROGRESS NOTES
Karine Batres (: 1935) is a 82 y.o. female, patient,here for evaluation of the following   Chief Complaint   Patient presents with    Foot Pain     left heel        ASSESSMENT/PLAN:  Below is the assessment and plan developed based on review of pertinent history, physical exam, labs, studies, and medications. 1. Plantar fasciitis of left foot    Patient is going through physical therapy and I recommend she maximize that treatment. Again reminded of appropriate shoe wear and insoles and to continue home program as previously demonstrated. No current surgical indications. Return if symptoms worsen or fail to improve. No Known Allergies    Current Outpatient Medications   Medication Sig    escitalopram oxalate (LEXAPRO) 10 mg tablet Take 10 mg by mouth daily.  buPROPion XL (WELLBUTRIN XL) 150 mg tablet Take 150 mg by mouth daily.  clonazePAM (KlonoPIN) 0.5 mg tablet Take 0.25 mg by mouth nightly. No current facility-administered medications for this visit. Past Medical History:   Diagnosis Date    Fracture of left olecranon process 2020       No past surgical history on file. No family history on file. Social History     Socioeconomic History    Marital status:      Spouse name: Not on file    Number of children: Not on file    Years of education: Not on file    Highest education level: Not on file   Occupational History    Not on file   Tobacco Use    Smoking status: Never Smoker    Smokeless tobacco: Never Used   Substance and Sexual Activity    Alcohol use:  Yes     Alcohol/week: 14.0 standard drinks     Types: 14 Glasses of wine per week    Drug use: Never    Sexual activity: Not on file   Other Topics Concern    Not on file   Social History Narrative    Not on file     Social Determinants of Health     Financial Resource Strain:     Difficulty of Paying Living Expenses: Not on file   Food Insecurity:     Worried About [address] in the Last Year: Not on file    Ran Out of Food in the Last Year: Not on file   Transportation Needs:     Lack of Transportation (Medical): Not on file    Lack of Transportation (Non-Medical): Not on file   Physical Activity:     Days of Exercise per Week: Not on file    Minutes of Exercise per Session: Not on file   Stress:     Feeling of Stress : Not on file   Social Connections:     Frequency of Communication with Friends and Family: Not on file    Frequency of Social Gatherings with Friends and Family: Not on file    Attends Orthodox Services: Not on file    Active Member of [employer] or Organizations: Not on file    Attends Club or Organization Meetings: Not on file    Marital Status: Not on file   Intimate Partner Violence:     Fear of Current or Ex-Partner: Not on file    Emotionally Abused: Not on file    Physically Abused: Not on file    Sexually Abused: Not on file   Housing Stability:     Unable to Pay for Housing in the Last Year: Not on file    Number of [place] in the Last Year: Not on file    Unstable Housing in the Last Year: Not on file           Vitals:  Ht 5' 2\" (1.575 m)   Wt 117 lb (53.1 kg)   BMI 21.40 kg/m²    Body mass index is 21.4 kg/m². ROS     Positive for: Musculoskeletal    Last edited by Sully Maldonado on 7/15/2022  1:35 PM. (History)              SUBJECTIVE/OBJECTIVE:  Gerhardt Freed (: 1935)   Patient back for reevaluation left Planter fasciitis. She is going to physical therapy and just completed therapy a few minutes ago. She has no other complaints, feels she is benefiting from the therapy program and feels a lot better. Physical Exam  Pleasant well-nourished female , alert and oriented to person, time and place, no acute distress. Mostly normal gait, satisfactory weightbearing stance. Left Lower Extremity: FROM actively of toes, foot, ankle, knee and hip. No instability of foot or ankle with drawer and stress.   55 strength to TAEHLGSCPeronealsPTib. No skin lesions. TTP at the medial calcaneal insertion of the plantar fascia. This spot is very painful. Silverskoid exam is Positive for gastric tightness. Neurovascular exam intact for light touch sensation, cap refill, dorsalis pedis pulse palpable, flexion/extension strength 5/5. Skin intact without open wounds, lesions or ulcers, no skin discolorations, normal warmth to skin. Imaging:    No x-rays obtained. An electronic signature was used to authenticate this note.   -- Jason Cooley MD

## 2022-07-21 ENCOUNTER — OFFICE VISIT (OUTPATIENT)
Dept: ORTHOPEDIC SURGERY | Age: 87
End: 2022-07-21
Payer: MEDICARE

## 2022-07-21 DIAGNOSIS — M72.2 PLANTAR FASCIITIS OF LEFT FOOT: Primary | ICD-10-CM

## 2022-07-21 DIAGNOSIS — R26.2 DIFFICULTY WALKING: ICD-10-CM

## 2022-07-21 DIAGNOSIS — M79.672 PAIN OF LEFT HEEL: ICD-10-CM

## 2022-07-21 PROCEDURE — 97140 MANUAL THERAPY 1/> REGIONS: CPT | Performed by: PHYSICAL THERAPIST

## 2022-07-21 PROCEDURE — 97110 THERAPEUTIC EXERCISES: CPT | Performed by: PHYSICAL THERAPIST

## 2022-07-21 NOTE — PROGRESS NOTES
PT DAILY TREATMENT NOTE    Patient Name: Karine Batres  Date:2022  : 1935  [x]  Patient  Verified  Payor: Libby Fish / Plan: [place] MED[place] / Product Type: Medicare /    Total Treatment Time (min): 40  Total Treatment Time 1:1 (min): 40  Referring Provider: Tiffany Garay MD    Treatment Area: L foot    SUBJECTIVE  Subjective functional status/changes:   [] No changes reported  Patient states that her foot has been feeling about the same. She has been doing her stretches regularly. She states that she would like to work on her balance. OBJECTIVE    Therapeutic Exercise: (minutes: 25)    PT Exercise Log        Activity/Exercise Date  22    Activity/Exercise      Slantboard   X      Seated calf stretch X     PF/DF with blue tband   X       Tandem stance X     Forward step overs X     Lateral step overs X   Balance board   X                         Manual Therapy: (minutes: 15) Treatment consisted of soft tissue mobilization of left Achilles and gastroc/soleus complex in supine and prone. Grade 3 L posterior talocrural glides. Grade 3 midfoot mobilizations. Manual gastroc stretch. ASSESSMENT  []  See Plan of Care  []  See progress note/recertification  [x]  Patient will continue to benefit from skilled therapy to address remaining functional deficits: prolonged walking. Patient has had minimal improvements in heel pain since last visit and continues to have pain with ambulation. She did well with all exercises today. We will continue plan and follow up next week. ICD-10-CM ICD-9-CM    1. Plantar fasciitis of left foot  M72.2 728.71       2. Pain of left heel  M79.672 729.5       3.  Difficulty walking  R26.2 719.7         Progress towards goals / Updated goals:    PLAN  []  Upgrade activities as tolerated     [x]  Continue plan of care  []  Discharge due to:_  [] Other:_      Gill Brooke, PT 2022  1:57 PM

## 2022-07-28 ENCOUNTER — OFFICE VISIT (OUTPATIENT)
Dept: ORTHOPEDIC SURGERY | Age: 87
End: 2022-07-28
Payer: MEDICARE

## 2022-07-28 DIAGNOSIS — M79.672 PAIN OF LEFT HEEL: ICD-10-CM

## 2022-07-28 DIAGNOSIS — R26.2 DIFFICULTY WALKING: ICD-10-CM

## 2022-07-28 DIAGNOSIS — M72.2 PLANTAR FASCIITIS OF LEFT FOOT: Primary | ICD-10-CM

## 2022-07-28 PROCEDURE — 97110 THERAPEUTIC EXERCISES: CPT | Performed by: PHYSICAL THERAPIST

## 2022-07-28 PROCEDURE — 97140 MANUAL THERAPY 1/> REGIONS: CPT | Performed by: PHYSICAL THERAPIST

## 2022-07-28 NOTE — PROGRESS NOTES
PT DAILY TREATMENT NOTE    Patient Name: Timur Dry  Date:2022  : 1935  [x]  Patient  Verified  Payor: Nisha Reyes / Plan: [place] MEDICARE PART A & B / Product Type: Medicare /    Total Treatment Time (min): 40  Total Treatment Time 1:1 (min): 40  Referring Provider: Aixa Tineo MD    Treatment Area: L foot    SUBJECTIVE  Subjective functional status/changes:   [] No changes reported  Wants to know what next steps are. Foot better    OBJECTIVE    Therapeutic Exercise: (minutes: 25)    PT Exercise Log        Activity/Exercise Date  22    Activity/Exercise      Slantboard   X      Seated calf stretch X     PF/DF with blue tband   X       Tandem stance X     Forward step overs X     Lateral step overs X   Balance board   X                         Manual Therapy: (minutes: 15) Treatment consisted of soft tissue mobilization of left Achilles and gastroc/soleus complex in supine and prone. Grade 3 L posterior talocrural glides. Grade 3 midfoot mobilizations. Manual gastroc stretch. ASSESSMENT  []  See Plan of Care  []  See progress note/recertification  [x]  Patient will continue to benefit from skilled therapy to address remaining functional deficits: prolonged walking. Slight improvement. Balance impaired as there were several episodes of instability. May benefit from AD      ICD-10-CM ICD-9-CM    1. Plantar fasciitis of left foot  M72.2 728.71       2. Pain of left heel  M79.672 729.5       3.  Difficulty walking  R26.2 719.7         Progress towards goals / Updated goals:    PLAN  []  Upgrade activities as tolerated     [x]  Continue plan of care  []  Discharge due to:_  [] Other:_      Mayo Galan, PT 2022  1:57 PM

## 2022-08-04 ENCOUNTER — OFFICE VISIT (OUTPATIENT)
Dept: ORTHOPEDIC SURGERY | Age: 87
End: 2022-08-04
Payer: MEDICARE

## 2022-08-04 DIAGNOSIS — M72.2 PLANTAR FASCIITIS OF LEFT FOOT: Primary | ICD-10-CM

## 2022-08-04 DIAGNOSIS — R26.2 DIFFICULTY WALKING: ICD-10-CM

## 2022-08-04 DIAGNOSIS — M79.672 PAIN OF LEFT HEEL: ICD-10-CM

## 2022-08-04 PROCEDURE — 97110 THERAPEUTIC EXERCISES: CPT | Performed by: PHYSICAL THERAPIST

## 2022-08-04 PROCEDURE — 97140 MANUAL THERAPY 1/> REGIONS: CPT | Performed by: PHYSICAL THERAPIST

## 2022-08-04 NOTE — PROGRESS NOTES
PT DAILY TREATMENT NOTE    Patient Name: Lisa Hoffmann  Date:2022  : 1935  [x]  Patient  Verified  Payor: Bhargavi Zoila / Plan: [place] MEDICARE PART A & B / Product Type: Medicare /    Total Treatment Time (min): 40  Total Treatment Time 1:1 (min): 40  Referring Provider: Iven Brunner, MD    Treatment Area: L foot    SUBJECTIVE  Subjective functional status/changes:   [] No changes reported  Patient states that her foot is doing much better. OBJECTIVE    Therapeutic Exercise: (minutes: 25)    PT Exercise Log        Activity/Exercise Date  22    Activity/Exercise      Slantboard   X      Seated calf stretch X     PF/DF with blue tband   X       Tandem stance X     Forward step overs X     Lateral step overs X   Balance board   X                         Manual Therapy: (minutes: 15) Treatment consisted of soft tissue mobilization of left Achilles and gastroc/soleus complex in supine and prone. Grade 3 L posterior talocrural glides. Grade 3 midfoot mobilizations. Manual gastroc stretch. ASSESSMENT  []  See Plan of Care  []  See progress note/recertification  [x]  Patient will continue to benefit from skilled therapy to address remaining functional deficits: prolonged walking. Patient has been having significantly less heel pain. She does continue to present with poor balance during ambulation. Patient is disinterested in using a cane. Patient was instructed in use of a cane today. We will plan to continue to practice ambulation with cane as that would be the appropriate assistive device at this time. ICD-10-CM ICD-9-CM    1. Plantar fasciitis of left foot  M72.2 728.71       2. Pain of left heel  M79.672 729.5       3.  Difficulty walking  R26.2 719.7         Progress towards goals / Updated goals:    PLAN  []  Upgrade activities as tolerated     [x]  Continue plan of care  []  Discharge due to:_  [] Other:_      Anna Austin, PT 2022  1:57 PM

## 2022-08-11 ENCOUNTER — OFFICE VISIT (OUTPATIENT)
Dept: ORTHOPEDIC SURGERY | Age: 87
End: 2022-08-11
Payer: MEDICARE

## 2022-08-11 DIAGNOSIS — R26.2 DIFFICULTY WALKING: ICD-10-CM

## 2022-08-11 DIAGNOSIS — M72.2 PLANTAR FASCIITIS OF LEFT FOOT: Primary | ICD-10-CM

## 2022-08-11 DIAGNOSIS — M79.672 PAIN OF LEFT HEEL: ICD-10-CM

## 2022-08-11 PROCEDURE — 97140 MANUAL THERAPY 1/> REGIONS: CPT | Performed by: PHYSICAL THERAPIST

## 2022-08-11 PROCEDURE — 97110 THERAPEUTIC EXERCISES: CPT | Performed by: PHYSICAL THERAPIST

## 2022-08-12 NOTE — PROGRESS NOTES
PT DAILY TREATMENT NOTE    Patient Name: Ana Laura Gillette  Date:2022  : 1935  [x]  Patient  Verified  Payor: Yon Adams / Plan: [place] MEDICARE PART A & B / Product Type: Medicare /    Total Treatment Time (min): 25  Total Treatment Time 1:1 (min): 25  Referring Provider: Sagar Cleary MD    Treatment Area: L foot    SUBJECTIVE  Subjective functional status/changes:   [] No changes reported  Patient states that her heel hurts quite a bit. She states that she has been trying to use a cane. OBJECTIVE    Therapeutic Exercise: (minutes: 10)    PT Exercise Log        Activity/Exercise Date  22    Activity/Exercise      Slantboard   X       Tandem stance X     Forward step overs X     Lateral step overs X   Balance board   X                         Manual Therapy: (minutes: 15) Treatment consisted of soft tissue mobilization of left Achilles and gastroc/soleus complex in supine and prone. Grade 3 L posterior talocrural glides. Grade 3 midfoot mobilizations. Manual gastroc stretch. ASSESSMENT  []  See Plan of Care  []  See progress note/recertification  [x]  Patient will continue to benefit from skilled therapy to address remaining functional deficits: prolonged walking. Patient was 30 minutes late to appointment today therefore interventions were modified as time allowed. Patient has been having persisting right heel pain. She has poor gait mechanics with use of SPC. She was provided with significant verbal cueing for proper use of cane. Patient demonstrates significant loss of balance backwards. Patient may benefit from use of walker. ICD-10-CM ICD-9-CM    1. Plantar fasciitis of left foot  M72.2 728.71       2. Pain of left heel  M79.672 729.5       3.  Difficulty walking  R26.2 719.7         Progress towards goals / Updated goals:    PLAN  []  Upgrade activities as tolerated     [x]  Continue plan of care  []  Discharge due to:_  [] Other:_      Valdo Ashraf, PT 2022  1:57 PM

## 2022-08-18 ENCOUNTER — OFFICE VISIT (OUTPATIENT)
Dept: ORTHOPEDIC SURGERY | Age: 87
End: 2022-08-18
Payer: MEDICARE

## 2022-08-18 DIAGNOSIS — M79.672 PAIN OF LEFT HEEL: Primary | ICD-10-CM

## 2022-08-18 DIAGNOSIS — M72.2 PLANTAR FASCIITIS OF LEFT FOOT: ICD-10-CM

## 2022-08-18 DIAGNOSIS — R26.2 DIFFICULTY WALKING: ICD-10-CM

## 2022-08-18 PROCEDURE — 97110 THERAPEUTIC EXERCISES: CPT | Performed by: PHYSICAL THERAPIST

## 2022-08-18 PROCEDURE — 97140 MANUAL THERAPY 1/> REGIONS: CPT | Performed by: PHYSICAL THERAPIST

## 2022-08-18 NOTE — PROGRESS NOTES
PT DAILY TREATMENT NOTE    Patient Name: Monica Akhtar  Date:2022  : 1935  [x]  Patient  Verified  Payor: Isaiah Alt / Plan: [place] MEDICARE PART A & B / Product Type: Medicare /    Total Treatment Time (min): 40  Total Treatment Time 1:1 (min): 40  Referring Provider: Stuart Beck MD    Treatment Area: L foot    SUBJECTIVE  Subjective functional status/changes:   [] No changes reported  Patient reports her foot is feeling better. She does state however she needs to work on her balance. She did fall at [facility] the other day, has a tendency to fall backwards. She did not hurt herself or hit her head and was able to get up on her own. OBJECTIVE    Therapeutic Exercise: (minutes: 25) one-on-one throughout    PT Exercise Log        Activity/Exercise Date  22    Activity/Exercise      Slantboard   X       Tandem stance with gaitbelt X     Forward step overs      Lateral step overs    Balance board with gaitbelt   X     TB PF/DF X                   Manual Therapy: (minutes: 15) Treatment consisted of soft tissue mobilization of left Achilles and gastroc/soleus complex in supine and prone. Grade 3 L posterior talocrural glides. Grade 3 midfoot mobilizations. Manual gastroc stretch. ASSESSMENT  []  See Plan of Care  []  See progress note/recertification  [x]  Patient will continue to benefit from skilled therapy to address remaining functional deficits: prolonged walking. Subjective improvement in heel pain. She does have significant balance deficits. I discussed with her several times how she would be more successful with a walker versus cane as she demonstrates fairly poor mechanics utilizing but is not interested in this. We will continue focusing on balance activities to maximize safety. ICD-10-CM ICD-9-CM    1. Pain of left heel  M79.672 729.5       2. Difficulty walking  R26.2 719.7       3.  Plantar fasciitis of left foot  M72.2 728.71         Progress towards goals / Updated goals:     PLAN  []  Upgrade activities as tolerated     [x]  Continue plan of care  []  Discharge due to:_  [] Other:_      Antonette Isaac, PT 8/18/2022  1:57 PM

## 2022-08-25 ENCOUNTER — OFFICE VISIT (OUTPATIENT)
Dept: ORTHOPEDIC SURGERY | Age: 87
End: 2022-08-25
Payer: MEDICARE

## 2022-08-25 DIAGNOSIS — M79.672 PAIN OF LEFT HEEL: Primary | ICD-10-CM

## 2022-08-25 DIAGNOSIS — M72.2 PLANTAR FASCIITIS OF LEFT FOOT: ICD-10-CM

## 2022-08-25 DIAGNOSIS — R26.2 DIFFICULTY WALKING: ICD-10-CM

## 2022-08-25 PROCEDURE — 97110 THERAPEUTIC EXERCISES: CPT | Performed by: PHYSICAL THERAPIST

## 2022-08-25 PROCEDURE — 97140 MANUAL THERAPY 1/> REGIONS: CPT | Performed by: PHYSICAL THERAPIST

## 2022-08-25 NOTE — PROGRESS NOTES
PT DAILY TREATMENT NOTE    Patient Name: Julius Pfeiffer  Date:2022  : 1935  [x]  Patient  Verified  Payor: Kaley Blanchard / Plan: [place] MEDICARE PART A & B / Product Type: Medicare /    Total Treatment Time (min): 40  Total Treatment Time 1:1 (min): 40  Referring Provider: Luis Antonio Lopez MD    Treatment Area: L foot    SUBJECTIVE  Subjective functional status/changes:   [] No changes reported  Patient states that she does feel more stable using cane    OBJECTIVE    Gait training with SPC cane performed. Therapeutic Exercise: (minutes: 25) one-on-one throughout    PT Exercise Log        Activity/Exercise Date  22    Activity/Exercise      Slantboard   X       Tandem stance with gaitbelt X     Forward step overs      Lateral step overs    Balance board with gaitbelt   X     TB PF/DF X                   Manual Therapy: (minutes: 15) Treatment consisted of soft tissue mobilization of left Achilles and gastroc/soleus complex in supine and prone. Grade 3 L posterior talocrural glides. Grade 3 midfoot mobilizations. Manual gastroc stretch. ASSESSMENT  []  See Plan of Care  []  See progress note/recertification  [x]  Patient will continue to benefit from skilled therapy to address remaining functional deficits: prolonged walking. Subjective improvement in heel pain. She does have significant balance deficits which are slightly better with SPC. We will continue focusing on balance activities to maximize safety. ICD-10-CM ICD-9-CM    1. Pain of left heel  M79.672 729.5       2. Difficulty walking  R26.2 719.7       3.  Plantar fasciitis of left foot  M72.2 728.71         Progress towards goals / Updated goals:    PLAN  []  Upgrade activities as tolerated     [x]  Continue plan of care  []  Discharge due to:_  [] Other:_      Laverne Mosley, PT 2022  1:57 PM

## 2022-09-07 ENCOUNTER — OFFICE VISIT (OUTPATIENT)
Dept: ORTHOPEDIC SURGERY | Age: 87
End: 2022-09-07
Payer: MEDICARE

## 2022-09-07 DIAGNOSIS — R26.2 DIFFICULTY WALKING: ICD-10-CM

## 2022-09-07 DIAGNOSIS — M79.672 PAIN OF LEFT HEEL: Primary | ICD-10-CM

## 2022-09-07 PROCEDURE — 97112 NEUROMUSCULAR REEDUCATION: CPT | Performed by: PHYSICAL THERAPIST

## 2022-09-08 ENCOUNTER — OFFICE VISIT (OUTPATIENT)
Dept: ORTHOPEDIC SURGERY | Age: 87
End: 2022-09-08
Payer: MEDICARE

## 2022-09-08 DIAGNOSIS — M72.2 PLANTAR FASCIITIS OF LEFT FOOT: ICD-10-CM

## 2022-09-08 DIAGNOSIS — M79.672 PAIN OF LEFT HEEL: Primary | ICD-10-CM

## 2022-09-08 DIAGNOSIS — R26.2 DIFFICULTY WALKING: ICD-10-CM

## 2022-09-08 PROCEDURE — 97140 MANUAL THERAPY 1/> REGIONS: CPT | Performed by: PHYSICAL THERAPIST

## 2022-09-08 PROCEDURE — 97112 NEUROMUSCULAR REEDUCATION: CPT | Performed by: PHYSICAL THERAPIST

## 2022-09-08 NOTE — PROGRESS NOTES
PT DAILY TREATMENT NOTE    Patient Name: Gerhardt Freed  Date:2022  : 1935  [x]  Patient  Verified  Payor: Mohamud Mena / Plan: [place] MEDICARE PART A & B / Product Type: Medicare /    Total Treatment Time (min): 30  Total Treatment Time 1:1 (min): 30  Referring Provider: Satish Momin MD    Treatment Area: L foot    SUBJECTIVE  Subjective functional status/changes:   [] No changes reported  Patient reports her heel is still bothering her. OBJECTIVE    Gait training with SPC cane performed. Neuromuscular Re-education: (minutes: 20 one-one-one throughout)     PT Exercise Log        Activity/Exercise Date  22    Activity/Exercise      Slantboard   X       Tandem stance with gaitbelt X     Forward step overs X     Lateral step overs X   Balance board with gaitbelt   X     TB PF/DF X     Lateral Stepovers on foam X     Step Ups- Fwd and Lat X     Manual therapy (10 minutes): STM/MFR to heel and plantar fascia, ankle distraction, grade 2 talocrural joint mobilization    ASSESSMENT  []  See Plan of Care  []  See progress note/recertification  [x]  Patient will continue to benefit from skilled therapy to address remaining functional deficits: prolonged walking. She is looking steadier with her cane. Still requires cueing to use properly instead of holding it backwards. She does have noted soft tissue restrictions through the plantar fascial.  We will continue focusing on balance and functional strengthening to avoid risk of falls. ICD-10-CM ICD-9-CM    1. Pain of left heel  M79.672 729.5       2. Difficulty walking  R26.2 719.7       3.  Plantar fasciitis of left foot  M72.2 728.71         Progress towards goals / Updated goals:    PLAN  [x]  Upgrade activities as tolerated     [x]  Continue plan of care  []  Discharge due to:_  [] Other:_      Ger Romero, PT 2022  1:57 PM

## 2022-09-08 NOTE — PROGRESS NOTES
PT DAILY TREATMENT NOTE    Patient Name: Trevor Castillo  Date:2022  : 1935  [x]  Patient  Verified  Payor: Jennifer Grit / Plan: [place] MEDICARE PART A & B / Product Type: Medicare /    Total Treatment Time (min): 30  Total Treatment Time 1:1 (min): 30  Referring Provider: Nury Barnhart MD    Treatment Area: L foot    SUBJECTIVE  Subjective functional status/changes:   [] No changes reported  Patient states that her primary goal for therapy is to improve her balance and be able to walk up and down stairs alternating. She does note that she loses her balance frequently. She has not had any recent falls. OBJECTIVE    Gait training with SPC cane performed. Neuromuscular Re-education: (minutes: 30) one-on-one throughout    PT Exercise Log        Activity/Exercise Date  22    Activity/Exercise      Slantboard   X       Tandem stance with gaitbelt X     Forward step overs X     Lateral step overs X   Balance board with gaitbelt   X     TB PF/DF X     Lateral Stepovers on foam X     Step Ups- Fwd and Lat X       ASSESSMENT  []  See Plan of Care  []  See progress note/recertification  [x]  Patient will continue to benefit from skilled therapy to address remaining functional deficits: prolonged walking. Patient has had significant improvement in heel pain. She still has difficulty with use of SPC and loss of balance. We will continue primary focus of therapy on improving gait pattern with use of SPC, improving stair mobility, and improving balance. ICD-10-CM ICD-9-CM    1. Pain of left heel  M79.672 729.5       2.  Difficulty walking  R26.2 719.7         Progress towards goals / Updated goals:    PLAN  []  Upgrade activities as tolerated     [x]  Continue plan of care  []  Discharge due to:_  [] Other:_      Orvil Jamil, PT 2022  1:57 PM

## 2022-09-15 ENCOUNTER — OFFICE VISIT (OUTPATIENT)
Dept: ORTHOPEDIC SURGERY | Age: 87
End: 2022-09-15
Payer: MEDICARE

## 2022-09-15 DIAGNOSIS — M79.672 PAIN OF LEFT HEEL: Primary | ICD-10-CM

## 2022-09-15 DIAGNOSIS — M72.2 PLANTAR FASCIITIS OF LEFT FOOT: ICD-10-CM

## 2022-09-15 DIAGNOSIS — R26.2 DIFFICULTY WALKING: ICD-10-CM

## 2022-09-15 PROCEDURE — 97140 MANUAL THERAPY 1/> REGIONS: CPT | Performed by: PHYSICAL THERAPIST

## 2022-09-15 PROCEDURE — 97112 NEUROMUSCULAR REEDUCATION: CPT | Performed by: PHYSICAL THERAPIST

## 2022-09-15 NOTE — PROGRESS NOTES
PT DAILY TREATMENT NOTE    Patient Name: Monica Akhtar  Date:9/15/2022  : 1935  [x]  Patient  Verified  Payor: Isaiah Alt / Plan: [place] MEDICARE PART A & B / Product Type: Medicare /    Total Treatment Time (min): 30  Total Treatment Time 1:1 (min): 30  Referring Provider: Stuart Beck MD    Treatment Area: L foot    SUBJECTIVE  Subjective functional status/changes:   [] No changes reported  Patient reports her heel is still bothering her. OBJECTIVE    Gait training with SPC cane performed. Neuromuscular Re-education: (minutes: 20 one-one-one throughout)     PT Exercise Log        Activity/Exercise Date  09/15/22    Activity/Exercise      Slantboard   X       Tandem stance with gaitbelt X     Forward step overs X     Lateral step overs X   Balance board with gaitbelt   X     TB PF/DF X     Lateral Stepovers on foam X     Step Ups- Fwd and Lat X     Manual therapy (10 minutes): STM/MFR to heel and plantar fascia, ankle distraction, grade 2 talocrural joint mobilization    ASSESSMENT  []  See Plan of Care  []  See progress note/recertification  [x]  Patient will continue to benefit from skilled therapy to address remaining functional deficits: prolonged walking. Improved balance/gait with game Still requires cueing to use properly instead of holding it backwards. She does have noted soft tissue restrictions through the plantar fascia. We will continue focusing on balance and functional strengthening to avoid risk of falls. ICD-10-CM ICD-9-CM    1. Pain of left heel  M79.672 729.5       2. Difficulty walking  R26.2 719.7       3.  Plantar fasciitis of left foot  M72.2 728.71         Progress towards goals / Updated goals:    PLAN  [x]  Upgrade activities as tolerated     [x]  Continue plan of care  []  Discharge due to:_  [] Other:_      Sunitha Renteria, PT 9/15/2022  1:57 PM

## 2022-09-22 ENCOUNTER — OFFICE VISIT (OUTPATIENT)
Dept: ORTHOPEDIC SURGERY | Age: 87
End: 2022-09-22
Payer: MEDICARE

## 2022-09-22 DIAGNOSIS — M79.672 PAIN OF LEFT HEEL: Primary | ICD-10-CM

## 2022-09-22 DIAGNOSIS — M72.2 PLANTAR FASCIITIS OF LEFT FOOT: ICD-10-CM

## 2022-09-22 DIAGNOSIS — R26.2 DIFFICULTY WALKING: ICD-10-CM

## 2022-09-22 PROCEDURE — 97110 THERAPEUTIC EXERCISES: CPT | Performed by: PHYSICAL THERAPIST

## 2022-09-22 PROCEDURE — 97140 MANUAL THERAPY 1/> REGIONS: CPT | Performed by: PHYSICAL THERAPIST

## 2022-09-22 NOTE — PROGRESS NOTES
PT DAILY TREATMENT NOTE    Patient Name: Renaldo Dorsey  Date:2022  : 1935  [x]  Patient  Verified  Payor: Tu Berry / Plan: [place] MEDICARE PART A & B / Product Type: Medicare /    Total Treatment Time (min): 30  Total Treatment Time 1:1 (min): 30  Referring Provider: Severino Olvera MD    Treatment Area: L foot    SUBJECTIVE  Subjective functional status/changes:   [] No changes reported  Continues with foot pain. No improvement subjectively     OBJECTIVE    Gait training with SPC cane performed. Neuromuscular Re-education: (minutes: 20 one-one-one throughout)     PT Exercise Log        Activity/Exercise Date  22    Activity/Exercise      Slantboard   X       Tandem stance with gaitbelt X     Forward step overs X     Lateral step overs X   Balance board with gaitbelt   X     TB PF/DF X     Lateral Stepovers on foam X     Step Ups- Fwd and Lat X     Sliders X     Heel raises      Manual therapy (10 minutes): STM/MFR to heel and plantar fascia, ankle distraction, grade 2 talocrural joint mobilization    ASSESSMENT  []  See Plan of Care  []  See progress note/recertification  [x]  Patient will continue to benefit from skilled therapy to address remaining functional deficits: prolonged walking. Improved balance/gait with game Still requires cueing to use properly instead of holding it backwards. She does have noted soft tissue restrictions through the plantar fascia. We will continue focusing on balance and functional strengthening to avoid risk of falls. ICD-10-CM ICD-9-CM    1. Pain of left heel  M79.672 729.5       2. Difficulty walking  R26.2 719.7       3.  Plantar fasciitis of left foot  M72.2 728.71           Progress towards goals / Updated goals:    PLAN  [x]  Upgrade activities as tolerated     [x]  Continue plan of care  []  Discharge due to:_  [] Other:_      Ardena Spurling, PT 2022  1:57 PM

## 2022-09-29 ENCOUNTER — OFFICE VISIT (OUTPATIENT)
Dept: ORTHOPEDIC SURGERY | Age: 87
End: 2022-09-29
Payer: MEDICARE

## 2022-09-29 DIAGNOSIS — M79.672 PAIN OF LEFT HEEL: Primary | ICD-10-CM

## 2022-09-29 DIAGNOSIS — M72.2 PLANTAR FASCIITIS OF LEFT FOOT: ICD-10-CM

## 2022-09-29 DIAGNOSIS — R26.2 DIFFICULTY WALKING: ICD-10-CM

## 2022-09-29 PROCEDURE — 97140 MANUAL THERAPY 1/> REGIONS: CPT | Performed by: PHYSICAL THERAPIST

## 2022-09-29 PROCEDURE — 97110 THERAPEUTIC EXERCISES: CPT | Performed by: PHYSICAL THERAPIST

## 2022-09-29 NOTE — PROGRESS NOTES
PT DAILY TREATMENT NOTE    Patient Name: Keith Goodrich  Date:2022  : 1935  [x]  Patient  Verified  Payor: Stacy Members / Plan: [place] MED[place] & B / Product Type: Medicare /    Total Treatment Time (min): 30  Total Treatment Time 1:1 (min): 30  Referring Provider: Stephanie Travis MD    Treatment Area: L foot    SUBJECTIVE  Subjective functional status/changes:   [] No changes reported  Slight improvement    OBJECTIVE    Gait training with SPC cane performed. Therapeutic exercise: (minutes: 20 one-one-one throughout)     PT Exercise Log        Activity/Exercise Date  22    Activity/Exercise      Slantboard   X       Tandem stance with gaitbelt X     Forward step overs X     Lateral step overs X   Balance board with gaitbelt   X     TB PF/DF X     Lateral Stepovers on foam X     Step Ups- Fwd and Lat X     Sliders X   Mini squats X     Manual therapy (10 minutes): STM/MFR to heel and plantar fascia, ankle distraction, grade 2 talocrural joint mobilization    ASSESSMENT  []  See Plan of Care  []  See progress note/recertification  [x]  Patient will continue to benefit from skilled therapy to address remaining functional deficits: prolonged walking. Improved balance/gait with game Still requires cueing to use properly instead of holding it backwards. She does have noted soft tissue restrictions through the plantar fascia. We will continue focusing on balance and functional strengthening to avoid risk of falls. ICD-10-CM ICD-9-CM    1. Pain of left heel  M79.672 729.5       2. Difficulty walking  R26.2 719.7       3.  Plantar fasciitis of left foot  M72.2 728.71           Progress towards goals / Updated goals:    PLAN  [x]  Upgrade activities as tolerated     [x]  Continue plan of care  []  Discharge due to:_  [] Other:_      Roger Corral, PT 2022  1:57 PM

## 2022-09-30 ENCOUNTER — OFFICE VISIT (OUTPATIENT)
Dept: ORTHOPEDIC SURGERY | Age: 87
End: 2022-09-30
Payer: MEDICARE

## 2022-09-30 VITALS — HEIGHT: 62 IN | WEIGHT: 117 LBS | BODY MASS INDEX: 21.53 KG/M2

## 2022-09-30 DIAGNOSIS — M79.672 PAIN OF LEFT HEEL: ICD-10-CM

## 2022-09-30 DIAGNOSIS — M72.2 PLANTAR FASCIITIS OF LEFT FOOT: Primary | ICD-10-CM

## 2022-09-30 PROCEDURE — 20550 NJX 1 TENDON SHEATH/LIGAMENT: CPT | Performed by: ORTHOPAEDIC SURGERY

## 2022-09-30 RX ORDER — TRIAMCINOLONE ACETONIDE 40 MG/ML
40 INJECTION, SUSPENSION INTRA-ARTICULAR; INTRAMUSCULAR ONCE
Status: COMPLETED | OUTPATIENT
Start: 2022-09-30 | End: 2022-09-30

## 2022-09-30 RX ORDER — BUPIVACAINE HYDROCHLORIDE 7.5 MG/ML
3 INJECTION, SOLUTION EPIDURAL; RETROBULBAR ONCE
Status: COMPLETED | OUTPATIENT
Start: 2022-09-30 | End: 2022-09-30

## 2022-09-30 RX ADMIN — BUPIVACAINE HYDROCHLORIDE 22.5 MG: 7.5 INJECTION, SOLUTION EPIDURAL; RETROBULBAR at 16:01

## 2022-09-30 RX ADMIN — TRIAMCINOLONE ACETONIDE 40 MG: 40 INJECTION, SUSPENSION INTRA-ARTICULAR; INTRAMUSCULAR at 16:06

## 2022-09-30 NOTE — LETTER
10/5/2022    Patient: Riki Mckeon   YOB: 1935   Date of Visit: 9/30/2022     Dwaine Urbano MD  [address] [address] [address] [address]  Via Fax: [phone]    Dear Dwaine Urbano MD,      Thank you for referring Ms. Sonali Victoria to [facility] for evaluation. My notes for this consultation are attached. If you have questions, please do not hesitate to call me. I look forward to following your patient along with you.       Sincerely,    Bri Zuñiga MD

## 2022-09-30 NOTE — PROGRESS NOTES
Christina Treviño (: 1935) is a 82 y.o. female, patient,here for evaluation of the following   Chief Complaint   Patient presents with    Follow-up    Foot Pain        ASSESSMENT/PLAN:  Below is the assessment and plan developed based on review of pertinent history, physical exam, labs, studies, and medications. 1. Plantar fasciitis of left foot  -     REFERRAL TO PHYSICAL THERAPY  2. Pain of left heel    Deborah Nichols is here today for cortisone injection into the left heel related to Planter fasciitis which is failed initial conservative treatments. She elects to proceed with a cortisone injection understanding the procedure, risks, potential complications, expected outcomes, post injection limitations. There is a small risk for rupture of plantar fascia, recommend activity modification for a couple weeks. After verbal consent obtained from patient, she elects to proceed with a cortisone injection left plantar heel at the medial band of plantar fascia, the area is locally anesthetized with plain 0.75% Sensorcaine subcutaneously injected using 3 cc, then injected 1 cc of 40 mg/mL of Kenalog intrabursal.  Patient tolerated procedure well, informed of postinjection pain flare, small risk for infection. Return if symptoms worsen or fail to improve. No Known Allergies    Current Outpatient Medications   Medication Sig    escitalopram oxalate (LEXAPRO) 10 mg tablet Take 10 mg by mouth daily. buPROPion XL (WELLBUTRIN XL) 150 mg tablet Take 150 mg by mouth daily. clonazePAM (KlonoPIN) 0.5 mg tablet Take 0.25 mg by mouth nightly. No current facility-administered medications for this visit. Past Medical History:   Diagnosis Date    Fracture of left olecranon process 2020       No past surgical history on file. No family history on file.     Social History     Socioeconomic History    Marital status:      Spouse name: Not on file    Number of children: Not on file    Years of education: Not on file    Highest education level: Not on file   Occupational History    Not on file   Tobacco Use    Smoking status: Never    Smokeless tobacco: Never   Substance and Sexual Activity    Alcohol use: Yes     Alcohol/week: 14.0 standard drinks     Types: 14 Glasses of wine per week    Drug use: Never    Sexual activity: Not on file   Other Topics Concern    Not on file   Social History Narrative    Not on file     Social Determinants of Health     Financial Resource Strain: Not on file   Food Insecurity: Not on file   Transportation Needs: Not on file   Physical Activity: Not on file   Stress: Not on file   Social Connections: Not on file   Intimate Partner Violence: Not on file   Housing Stability: Not on file           Vitals:  Ht 5' 2\" (1.575 m)   Wt 117 lb (53.1 kg)   BMI 21.40 kg/m²    Body mass index is 21.4 kg/m². SUBJECTIVE:  Christy Riding (: 1935)   Patient back today, she has Planter fasciitis would like to consider cortisone injection today. We discussed the treatment in the past, she understands the risk and potential complications. She has tried an initial conservative treatments. OBJECTIVE EXAM:     Visit Vitals  Ht 5' 2\" (1.575 m)   Wt 117 lb (53.1 kg)   BMI 21.40 kg/m²       Appearance: Alert, well appearing and pleasant patient who is in no distress, oriented to person, place/time, and who follows commands. This patient is accompanied in the       office by her  self. Psychiatric: Affect and mood are appropriate. No dementia noted on examination  Musculoskeletal:  LOCATION: Pain at heel foot - left      Integumentary: No rashes, skin patches, wounds, or abrasions to the right or left legs       Warm and normal color. No regions of expressible drainage.       Gait: Normal      Tenderness: No tenderness        Motor/Strength/Tone Exam: Normal       Sensory Exam:   Intact Normal Sensation to ankle/foot      Stability Testing: No anterolateral or varus instability of the Ankle or Subtalar Joints               No peroneal tendon instability noted      ROM: Normal ROM noted to ankle/foot      Contractures: No Achilles or Gastrocnemius Contractures      Calf tenderness: Absent for calf or gastrocnemius muscle regions       Soft, supple, non tender, non taut lower extremity compartment  Alignment:      NEUTRAL Hindfoot,         none Metatarsus Adductus Metatarsus   Wounds/Abrasions:    None present  Extremities:   No embolic phenomena to the toes          No significant edema to the foot and or toes. Lower extremities are warm and appear well perfused    DVT: No evidence of DVT seen on examination at this time     No calf swelling, no tenderness to calf muscles  Lymphatic:  No Evidence of Lymphedema  Vascular: Medial Border of Tibia Region: Edema is not present         Pulses: Dorsalis Pedis &  Posterior Tibial Pulses : Palpable yes        Varicosities Lower Limbs :  None  noted  Neuro: Negative bilateral Straight leg raise (seated position)    See Musculoskeletal section for pertinent individual extremity examination    No abnormal hand/wrist, foot/ankle, or facial/neck tremors. Lower Extremity/Ankle/Foot:  Antalgic gait, satisfactory weightbearing stance. Left lower extremity: FROM actively of toes, foot, ankle, knee and hip. No instability of foot or ankle with drawer and stress. 55 strength to TAEHLGSCPeronealsPTib. No skin lesions. TTP at the medial calcaneal insertion of the plantar fascia. This spot is very painful. Silverskoid exam is Positive for gastric tightness. Contralateral lower extremity/ankle /foot exam:  Nontender, no swelling ligaments grossly stable. Normal weightbearing stance. Neurovascular exam grossly intact. Imaging:    NO X-rays were obtained today. An electronic signature was used to authenticate this note.   -- Jason Hernandez MD

## 2022-10-06 ENCOUNTER — TELEPHONE (OUTPATIENT)
Dept: ORTHOPEDIC SURGERY | Age: 87
End: 2022-10-06

## 2022-10-06 ENCOUNTER — OFFICE VISIT (OUTPATIENT)
Dept: ORTHOPEDIC SURGERY | Age: 87
End: 2022-10-06
Payer: MEDICARE

## 2022-10-06 DIAGNOSIS — M72.2 PLANTAR FASCIITIS OF LEFT FOOT: ICD-10-CM

## 2022-10-06 DIAGNOSIS — R26.2 DIFFICULTY WALKING: ICD-10-CM

## 2022-10-06 DIAGNOSIS — M79.672 PAIN OF LEFT HEEL: Primary | ICD-10-CM

## 2022-10-06 PROCEDURE — 97140 MANUAL THERAPY 1/> REGIONS: CPT | Performed by: PHYSICAL THERAPIST

## 2022-10-06 PROCEDURE — 97112 NEUROMUSCULAR REEDUCATION: CPT | Performed by: PHYSICAL THERAPIST

## 2022-10-06 NOTE — PROGRESS NOTES
PT DAILY TREATMENT NOTE    Patient Name: Christopher Acosta  Date:10/6/2022  : 1935  [x]  Patient  Verified  Payor: Puneet Myers / Plan: [place] MEDICARE PART A & B / Product Type: Medicare /    Total Treatment Time (min): 30  Total Treatment Time 1:1 (min): 30  Referring Provider: Marky Wharton MD    Treatment Area: L foot    SUBJECTIVE  Subjective functional status/changes:   [] No changes reported  Had an injection, no improvement. OBJECTIVE    Noted bruising along 5th metatarsal as well as increased erythema and pitta edema. Tender along 5th metatarsal. She denies any pain walking. Can't recall any injury. Neuromuscular Re-education: (minutes: 20 one-one-one throughout)     PT Exercise Log        Activity/Exercise Date  10/06/22    Activity/Exercise      Slantboard   X       Tandem stance with gaitbelt X     Forward step overs X     Lateral step overs X   Balance board with gaitbelt   X     TB PF/DF X     Lateral Stepovers on foam X     Step Ups- Fwd and Lat X     Sliders X     Heel raises      Manual therapy (10 minutes): STM/MFR to heel and plantar fascia, ankle distraction, grade 2 talocrural joint mobilization    ASSESSMENT  []  See Plan of Care  []  See progress note/recertification  [x]  Patient will continue to benefit from skilled therapy to address remaining functional deficits: prolonged walking. Improved balance/gait with game Still requires cueing to use properly instead of holding it backwards. She does have noted soft tissue restrictions through the plantar fascia. We will continue focusing on balance and functional strengthening to avoid risk of falls. ICD-10-CM ICD-9-CM    1. Pain of left heel  M79.672 729.5       2. Plantar fasciitis of left foot  M72.2 728.71       3. Difficulty walking  R26.2 719.7         Likely hit her foot on something. Instructed patient to keep foot elevated and continue icing at home.  Have her call MD if swelling/redness worsens or if increased pain weight bearing.   Progress towards goals / Updated goals:    PLAN  [x]  Upgrade activities as tolerated     [x]  Continue plan of care  []  Discharge due to:_  [] Other:_      Maite Hernandez, PT 10/6/2022  1:57 PM

## 2022-10-06 NOTE — TELEPHONE ENCOUNTER
Call from patient/ [facility] [place] [facility] dept. 6 days out from injection. No better . Ashok Copping How long before relief . Per Dr Colleen Menendez. Will not notice improvement until about 6 weeks .

## 2022-10-13 ENCOUNTER — OFFICE VISIT (OUTPATIENT)
Dept: ORTHOPEDIC SURGERY | Age: 87
End: 2022-10-13
Payer: MEDICARE

## 2022-10-13 DIAGNOSIS — M79.672 PAIN OF LEFT HEEL: Primary | ICD-10-CM

## 2022-10-13 DIAGNOSIS — M72.2 PLANTAR FASCIITIS OF LEFT FOOT: ICD-10-CM

## 2022-10-13 DIAGNOSIS — R26.2 DIFFICULTY WALKING: ICD-10-CM

## 2022-10-13 PROCEDURE — 97112 NEUROMUSCULAR REEDUCATION: CPT | Performed by: PHYSICAL THERAPIST

## 2022-10-13 NOTE — PROGRESS NOTES
PT DAILY TREATMENT NOTE    Patient Name: Suzanne Cote  Date:10/13/2022  : 1935  [x]  Patient  Verified  Payor: Alexandra Vizcaino / Plan: [place] MEDICARE PART A & B / Product Type: Medicare /    Total Treatment Time (min): 30  Total Treatment Time 1:1 (min): 30  Referring Provider: Virgie Capone MD    Treatment Area: L foot    SUBJECTIVE  Subjective functional status/changes:   [] No changes reported    25 minutes late, notes improved foot pain/swelling. OBJECTIVE    Neuromuscular Re-education: (minutes: 20 one-one-one throughout)     PT Exercise Log        Activity/Exercise Date  10/13/22    Activity/Exercise      Slantboard   X       Tandem stance with gait  belt X     Forward step overs X     Lateral step overs X   Balance board with gaitbelt   X     TB PF/DF X     Lateral Stepovers on foam X     Step Ups- Fwd and Lat X     Sliders X     Mini-squats X         ASSESSMENT  []  See Plan of Care  []  See progress note/recertification  [x]  Patient will continue to benefit from skilled therapy to address remaining functional deficits: prolonged walking. She does have noted soft tissue restrictions through the plantar fascia. We will continue focusing on balance and functional strengthening to avoid risk of falls. ICD-10-CM ICD-9-CM    1. Pain of left heel  M79.672 729.5       2. Difficulty walking  R26.2 719.7       3.  Plantar fasciitis of left foot  M72.2 728.71             Progress towards goals / Updated goals:    PLAN  [x]  Upgrade activities as tolerated     [x]  Continue plan of care  []  Discharge due to:_  [] Other:_      Uriel Urbano, PT 10/13/2022  1:57 PM

## 2022-10-20 ENCOUNTER — OFFICE VISIT (OUTPATIENT)
Dept: ORTHOPEDIC SURGERY | Age: 87
End: 2022-10-20
Payer: MEDICARE

## 2022-10-20 DIAGNOSIS — M79.672 PAIN OF LEFT HEEL: Primary | ICD-10-CM

## 2022-10-20 DIAGNOSIS — R26.2 DIFFICULTY WALKING: ICD-10-CM

## 2022-10-20 PROCEDURE — 97112 NEUROMUSCULAR REEDUCATION: CPT | Performed by: PHYSICAL THERAPIST

## 2022-10-20 PROCEDURE — 97110 THERAPEUTIC EXERCISES: CPT | Performed by: PHYSICAL THERAPIST

## 2022-10-20 NOTE — PROGRESS NOTES
PT DAILY TREATMENT NOTE    Patient Name: Ester Ortega  AAPA:  : 1935  [x]  Patient  Verified  Payor: Ortiz Hwang / Plan: [place] MEDICARE PART A & B / Product Type: Medicare /    Total Treatment Time (min): 45  Total Treatment Time 1:1 (min): 30  Referring Provider: Lauren Guzman MD    Treatment Area: L foot    SUBJECTIVE  Subjective functional status/changes:   [] No changes reported    Patient states that her foot has been feeling a lot better. She did however have a fall yesterday. OBJECTIVE    Neuromuscular Re-education: (minutes: 30 one-one-one throughout), Therex 15 minutes     PT Exercise Log        Activity/Exercise Date  10/20/22    Activity/Exercise      Nustep L2 10 min. X   Slantboard (Therex)   X       Tandem stance with gait  belt X     Forward step overs X     Lateral step overs X   Balance board with gaitbelt   X     TB PF/DF (Therex) X     Lateral Stepovers on foam X     Step Ups- Fwd and Lat X     LP- 90# (Therex) X     Mini-squats X   SAQs 3LBS X     ASSESSMENT  []  See Plan of Care  []  See progress note/recertification  [x]  Patient will continue to benefit from skilled therapy to address remaining functional deficits: prolonged walking. Patient's foot pain has improved significantly. She did however have a fall yesterday. Patient has significant retropulsion. I recommended that she start using a walker for ambulation due to her poor balance and history of falls. We will continue current plan and follow-up next week. ICD-10-CM ICD-9-CM    1. Pain of left heel  M79.672 729.5       2.  Difficulty walking  R26.2 719.7         Progress towards goals / Updated goals:    PLAN  [x]  Upgrade activities as tolerated     [x]  Continue plan of care  []  Discharge due to:_  [] Other:_      Madhavi Walker, PT 10/20/2022  1:57 PM

## 2022-10-27 ENCOUNTER — OFFICE VISIT (OUTPATIENT)
Dept: ORTHOPEDIC SURGERY | Age: 87
End: 2022-10-27
Payer: MEDICARE

## 2022-10-27 DIAGNOSIS — R26.2 DIFFICULTY WALKING: ICD-10-CM

## 2022-10-27 DIAGNOSIS — M79.672 PAIN OF LEFT HEEL: Primary | ICD-10-CM

## 2022-10-27 PROCEDURE — 97110 THERAPEUTIC EXERCISES: CPT | Performed by: PHYSICAL THERAPIST

## 2022-10-27 PROCEDURE — 97112 NEUROMUSCULAR REEDUCATION: CPT | Performed by: PHYSICAL THERAPIST

## 2022-10-27 NOTE — PROGRESS NOTES
PT DAILY TREATMENT NOTE    Patient Name: Christy Nair  ZKWJ:  : 1935  [x]  Patient  Verified  Payor: Pricila Lal / Plan: [place] MEDICARE PART A & B / Product Type: Medicare /    Total Treatment Time (min): 45  Total Treatment Time 1:1 (min): 30  Referring Provider: Victor Hugo Cottrell MD    Treatment Area: L foot    SUBJECTIVE  Subjective functional status/changes:   [] No changes reported    Patient states that she would like to learn core and bilateral lower extremity strengthening exercises. She would also like to work on balance. OBJECTIVE    Neuromuscular Re-education: (minutes: 30 one-one-one throughout), Therex 15 minutes     PT Exercise Log        Activity/Exercise Date  10/27/22    Activity/Exercise      Nustep L2 10 min. X   Slantboard (Therex)   X       Tandem stance with gait  belt X     Forward step overs X     Lateral step overs X   Balance board with gaitbelt   X     TB PF/DF (Therex) X     Lateral Stepovers on foam X     Step Ups- Fwd and Lat X     LP- 90# (Therex) X     Mini-squats X   SAQs 3LBS X     ASSESSMENT  []  See Plan of Care  []  See progress note/recertification  [x]  Patient will continue to benefit from skilled therapy to address remaining functional deficits: prolonged walking. Patient's foot has been doing well. Patient continues to demonstrate poor reactive balance. We will continue current plan with focus on progressing balance interventions to reduce risk of fall. ICD-10-CM ICD-9-CM    1. Pain of left heel  M79.672 729.5       2.  Difficulty walking  R26.2 719.7         Progress towards goals / Updated goals:    PLAN  [x]  Upgrade activities as tolerated     [x]  Continue plan of care  []  Discharge due to:_  [] Other:_      Karmen Davila, PT 10/27/2022  1:57 PM

## 2022-11-03 ENCOUNTER — OFFICE VISIT (OUTPATIENT)
Dept: ORTHOPEDIC SURGERY | Age: 87
End: 2022-11-03
Payer: MEDICARE

## 2022-11-03 DIAGNOSIS — R26.2 DIFFICULTY WALKING: ICD-10-CM

## 2022-11-03 DIAGNOSIS — M72.2 PLANTAR FASCIITIS OF LEFT FOOT: ICD-10-CM

## 2022-11-03 DIAGNOSIS — M79.672 PAIN OF LEFT HEEL: Primary | ICD-10-CM

## 2022-11-03 PROCEDURE — 97112 NEUROMUSCULAR REEDUCATION: CPT | Performed by: PHYSICAL THERAPIST

## 2022-11-03 NOTE — PROGRESS NOTES
PT DAILY TREATMENT NOTE    Patient Name: Marilyn [place]  Date:11/3/2022  : 1935  [x]  Patient  Verified  Payor: New Miguel / Plan: [place] MEDICARE PART A & B / Product Type: Medicare /    Total Treatment Time (min): 45  Total Treatment Time 1:1 (min): 30  Referring Provider: Eliu Morris MD    Treatment Area: L foot    SUBJECTIVE  Subjective functional status/changes:   [] No changes reported    Foot is feeling better. OBJECTIVE    Neuromuscular Re-education: (minutes: 30 one-one-one throughout)    PT Exercise Log        Activity/Exercise Date  22    Activity/Exercise      Nustep L2 10 min. X   Slantboard (Therex)   X       Tandem stance with gait  belt X     Forward step overs X     Lateral step overs X   Balance board with gaitbelt   X     TB PF/DF (Therex) X     Lateral Stepovers on foam X     Step Ups- Fwd and Lat X     LP- 90# (Therex) X     Mini-squats X   SAQs 3LBS X     ASSESSMENT  []  See Plan of Care  []  See progress note/recertification  [x]  Patient will continue to benefit from skilled therapy to address remaining functional deficits: prolonged walking. Patient's foot has been doing well. Patient continues to demonstrate poor reactive balance. We will continue current plan with focus on progressing balance interventions to reduce risk of fall. ICD-10-CM ICD-9-CM    1. Pain of left heel  M79.672 729.5       2. Difficulty walking  R26.2 719.7       3.  Plantar fasciitis of left foot  M72.2 728.71         Progress towards goals / Updated goals:    PLAN  [x]  Upgrade activities as tolerated     [x]  Continue plan of care  []  Discharge due to:_  [] Other:_      Mana Parikh, PT 11/3/2022  1:57 PM

## 2022-11-10 ENCOUNTER — OFFICE VISIT (OUTPATIENT)
Dept: ORTHOPEDIC SURGERY | Age: 87
End: 2022-11-10
Payer: MEDICARE

## 2022-11-10 DIAGNOSIS — R26.2 DIFFICULTY WALKING: ICD-10-CM

## 2022-11-10 DIAGNOSIS — M79.672 PAIN OF LEFT HEEL: Primary | ICD-10-CM

## 2022-11-10 PROCEDURE — 97112 NEUROMUSCULAR REEDUCATION: CPT | Performed by: PHYSICAL THERAPIST

## 2022-11-10 NOTE — PROGRESS NOTES
PT DAILY TREATMENT NOTE    Patient Name: Sally Bustillo  Date:11/10/2022  : 1935  [x]  Patient  Verified  Payor: Kristy Garciarosie / Plan: [place] MEDICARE PART A & B / Product Type: Medicare /    Total Treatment Time (min): 45  Total Treatment Time 1:1 (min): 30  Referring Provider: Dickson Flores MD    Treatment Area: L foot    SUBJECTIVE  Subjective functional status/changes:   [] No changes reported  Foot continuing to feel better. She does states she has been having some cramping in the back of her right leg and is wondering what she can do for sciatica    OBJECTIVE    Neuromuscular Re-education: (minutes: 30 one-one-one throughout)    PT Exercise Log        Activity/Exercise Date  11/10/22    Activity/Exercise      Nustep L2 10 min. X   Slantboard (Therex)   X       Tandem stance with gait  belt X     Forward step overs X     Lateral step overs X   Balance board with gaitbelt   X     TB PF/DF (Therex) X     Lateral Stepovers on foam X     Step Ups- Fwd and Lat X     LP- 90# (Therex) X     Mini-squats X   SAQs 3LBS X   SKTC  X   PPT X     ASSESSMENT  []  See Plan of Care  []  See progress note/recertification  [x]  Patient will continue to benefit from skilled therapy to address remaining functional deficits: prolonged walking. Subjective improvement in left heel pain. Her balance is improving, able to do tandem stance for 15 seconds now with minimal upper extremity support. Requiring less cueing for proper performance of functional strengthening exercises in the clinic. He did add to new exercises to help with her right-sided radicular symptoms, pictures provided for home. Continue working on balance/proprioceptive exercise in addition to strengthening            ICD-10-CM ICD-9-CM    1. Pain of left heel  M79.672 729.5       2.  Difficulty walking  R26.2 719.7         Progress towards goals / Updated goals:    PLAN  [x]  Upgrade activities as tolerated     [x]  Continue plan of care  []  Discharge due to:_  [] Other:_      Neal Leyva, PT 11/10/2022  1:57 PM

## 2022-11-17 ENCOUNTER — OFFICE VISIT (OUTPATIENT)
Dept: ORTHOPEDIC SURGERY | Age: 87
End: 2022-11-17
Payer: MEDICARE

## 2022-11-17 DIAGNOSIS — M72.2 PLANTAR FASCIITIS OF LEFT FOOT: ICD-10-CM

## 2022-11-17 DIAGNOSIS — R26.2 DIFFICULTY WALKING: ICD-10-CM

## 2022-11-17 DIAGNOSIS — M79.672 PAIN OF LEFT HEEL: Primary | ICD-10-CM

## 2022-11-17 PROCEDURE — 97112 NEUROMUSCULAR REEDUCATION: CPT | Performed by: PHYSICAL THERAPIST

## 2022-11-17 NOTE — PROGRESS NOTES
PT DAILY TREATMENT NOTE    Patient Name: Nafisa Garner  Date:2022  : 1935  [x]  Patient  Verified  Payor: Haydee Cuevas / Plan: [place] MEDICARE PART A & B / Product Type: Medicare /    Total Treatment Time (min): 45  Total Treatment Time 1:1 (min): 30  Referring Provider: Blank Simmons MD    Treatment Area: L foot    SUBJECTIVE  Subjective functional status/changes:   [] No changes reported  Foot continuing to feel better. OBJECTIVE    Neuromuscular Re-education: (minutes: 30 one-one-one throughout)    PT Exercise Log        Activity/Exercise Date  22    Activity/Exercise      Nustep L2 10 min. X   Slantboard (Therex)   X       Tandem stance with gait  belt X     Forward step overs X     Lateral step overs X   Balance board with gaitbelt   X     TB PF/DF (Therex) X     Lateral Stepovers on foam X     Step Ups- Fwd and Lat X     LP- 90# (Therex) X     Mini-squats X   SAQs 3LBS X   SKTC  X   PPT X     ASSESSMENT  []  See Plan of Care  []  See progress note/recertification  [x]  Patient will continue to benefit from skilled therapy to address remaining functional deficits: prolonged walking. Complaining of difficulty moving L LE when going up a step or curb. She continues with B LE weakness but doing well with in clinic strengthening. ICD-10-CM ICD-9-CM    1. Pain of left heel  M79.672 729.5       2. Difficulty walking  R26.2 719.7       3.  Plantar fasciitis of left foot  M72.2 728.71         Progress towards goals / Updated goals:    PLAN  [x]  Upgrade activities as tolerated     [x]  Continue plan of care  []  Discharge due to:_  [] Other:_      Randolph Tomlinson, PT 2022  1:57 PM

## 2022-12-01 ENCOUNTER — OFFICE VISIT (OUTPATIENT)
Dept: ORTHOPEDIC SURGERY | Age: 87
End: 2022-12-01
Payer: MEDICARE

## 2022-12-01 DIAGNOSIS — M72.2 PLANTAR FASCIITIS OF LEFT FOOT: ICD-10-CM

## 2022-12-01 DIAGNOSIS — R26.2 DIFFICULTY WALKING: ICD-10-CM

## 2022-12-01 DIAGNOSIS — M79.672 PAIN OF LEFT HEEL: Primary | ICD-10-CM

## 2022-12-01 NOTE — PROGRESS NOTES
PT DAILY TREATMENT NOTE    Patient Name: Naseem Dumont  Date:2022  : 1935  [x]  Patient  Verified  Payor: Vivian Garcia / Plan: [place] MEDICARE PART A & B / Product Type: Medicare /    Total Treatment Time (min): 45  Total Treatment Time 1:1 (min): 30  Referring Provider: Jil Cunha MD    Treatment Area: L foot    SUBJECTIVE  Subjective functional status/changes:   [] No changes reported  Reports foot is doing better, seeing Dr. Kyle Hernandez for follow-up tomorrow. OBJECTIVE    Neuromuscular Re-education: (minutes: 30 one-one-one throughout)    PT Exercise Log        Activity/Exercise Date  22    Activity/Exercise      Nustep L2 10 min. Slantboard (Therex)   X       Tandem stance with gait  belt X     Forward step overs X     Lateral step overs X   Balance board with gaitbelt   X     TB PF/DF (Therex) X     Lateral Stepovers on foam X     Step Ups- Fwd and Lat X     LP- 90# (Therex) X     Mini-squats X   SAQs 3LBS    SKTC     PPT      ASSESSMENT  []  See Plan of Care  []  See progress note/recertification  [x]  Patient will continue to benefit from skilled therapy to address remaining functional deficits: prolonged walking. Patient has been attending PT sporadically since July. Her planter fasciitis pain has improved substantially. She is also ambulating with much smoother mechanics with her cane. We have been focusing more on balance/proprioceptive exercise recently to help prevent falls, which she has also made gains with. Seeing Dr. Kyle Hernandez for follow-up tomorrow, she does not have any more future PT visits scheduled at this time. ICD-10-CM ICD-9-CM    1. Pain of left heel  M79.672 729.5       2. Difficulty walking  R26.2 719.7       3.  Plantar fasciitis of left foot  M72.2 728.71         Progress towards goals / Updated goals:    PLAN  [x]  Upgrade activities as tolerated     [x]  Continue plan of care  []  Discharge due to:_  [] Other:_      Magda Giron, PT 2022  1:57 PM

## 2022-12-02 ENCOUNTER — OFFICE VISIT (OUTPATIENT)
Dept: ORTHOPEDIC SURGERY | Age: 87
End: 2022-12-02
Payer: MEDICARE

## 2022-12-02 VITALS — WEIGHT: 117 LBS | HEIGHT: 62 IN | BODY MASS INDEX: 21.53 KG/M2

## 2022-12-02 DIAGNOSIS — M79.672 PAIN OF LEFT HEEL: ICD-10-CM

## 2022-12-02 DIAGNOSIS — M72.2 PLANTAR FASCIITIS, LEFT: Primary | ICD-10-CM

## 2022-12-02 PROCEDURE — 99213 OFFICE O/P EST LOW 20 MIN: CPT | Performed by: ORTHOPAEDIC SURGERY

## 2022-12-02 PROCEDURE — G8432 DEP SCR NOT DOC, RNG: HCPCS | Performed by: ORTHOPAEDIC SURGERY

## 2022-12-02 PROCEDURE — 1101F PT FALLS ASSESS-DOCD LE1/YR: CPT | Performed by: ORTHOPAEDIC SURGERY

## 2022-12-02 PROCEDURE — G8420 CALC BMI NORM PARAMETERS: HCPCS | Performed by: ORTHOPAEDIC SURGERY

## 2022-12-02 PROCEDURE — G8400 PT W/DXA NO RESULTS DOC: HCPCS | Performed by: ORTHOPAEDIC SURGERY

## 2022-12-02 PROCEDURE — G8427 DOCREV CUR MEDS BY ELIG CLIN: HCPCS | Performed by: ORTHOPAEDIC SURGERY

## 2022-12-02 PROCEDURE — 1123F ACP DISCUSS/DSCN MKR DOCD: CPT | Performed by: ORTHOPAEDIC SURGERY

## 2022-12-02 PROCEDURE — G8536 NO DOC ELDER MAL SCRN: HCPCS | Performed by: ORTHOPAEDIC SURGERY

## 2022-12-02 PROCEDURE — 1090F PRES/ABSN URINE INCON ASSESS: CPT | Performed by: ORTHOPAEDIC SURGERY

## 2022-12-02 NOTE — PROGRESS NOTES
Sona Rodriguez (: 1935) is a 82 y.o. female, patient,here for evaluation of the following   Chief Complaint   Patient presents with    Foot Pain     Left foot        ASSESSMENT/PLAN:  Below is the assessment and plan developed based on review of pertinent history, physical exam, labs, studies, and medications. 1. Plantar fasciitis, left  -     AMB SUPPLY ORDER  -     REFERRAL TO PHYSICAL THERAPY  2. Pain of left heel    Patient continues to have heel pain and the plantar fascial injection did not help for too long. This may not be Planter fasciitis and that was discussed with patient, she has some underlying weakness so I did recommend possibly exploring that problem with the back specialist to see if there is other reasons for heel pain other than Planter fasciitis. In the meantime I also provided new physical therapy referral to also address balance, proprioception, intrinsic muscle strengthening of the foot, toe yoga and other activities to improve overall balance during gait. I did recommend different types of shoes and insoles to try as well. If there is persistent pain and the spine specialist states nothing going on with her foot related to the back, then possibly obtaining an MRI without contrast at left foot, the patient can call my office and we will order an MRI without contrast and she can either return after the studies completed or let me know what is completed and I will review and discussed the findings with her by phone. If there is a need for her to return then she will return after review of MRI. This is all discussed with the patient and her daughter present during this evaluation. Return if symptoms worsen or fail to improve. No Known Allergies    Current Outpatient Medications   Medication Sig    escitalopram oxalate (LEXAPRO) 10 mg tablet Take 10 mg by mouth daily. buPROPion XL (WELLBUTRIN XL) 150 mg tablet Take 150 mg by mouth daily.     clonazePAM (KlonoPIN) 0.5 mg tablet Take 0.25 mg by mouth nightly. No current facility-administered medications for this visit. Past Medical History:   Diagnosis Date    Fracture of left olecranon process 2020       No past surgical history on file. No family history on file. Social History     Socioeconomic History    Marital status:      Spouse name: Not on file    Number of children: Not on file    Years of education: Not on file    Highest education level: Not on file   Occupational History    Not on file   Tobacco Use    Smoking status: Never    Smokeless tobacco: Never   Substance and Sexual Activity    Alcohol use: Yes     Alcohol/week: 14.0 standard drinks     Types: 14 Glasses of wine per week    Drug use: Never    Sexual activity: Not on file   Other Topics Concern    Not on file   Social History Narrative    Not on file     Social Determinants of Health     Financial Resource Strain: Not on file   Food Insecurity: Not on file   Transportation Needs: Not on file   Physical Activity: Not on file   Stress: Not on file   Social Connections: Not on file   Intimate Partner Violence: Not on file   Housing Stability: Not on file           Vitals:  Ht 5' 2\" (1.575 m)   Wt 117 lb (53.1 kg)   BMI 21.40 kg/m²    Body mass index is 21.4 kg/m². SUBJECTIVE:  Martell Cabrera (: 1935)   Patient returns today for reevaluation of the left foot, we had did plantar heel injection last time seen as we thought this could be Planter fasciitis but she did not have pain relief for too long. She continues to have problems and now she is describing having some weakness to this extremity. She states every time she walks sitting on a hard chair her foot under her chair, she is having some weakness and she still has pain. She is using crutches and a cane and also has physical therapy. She is not diabetic, non-smoker.         OBJECTIVE EXAM:     Visit Vitals  Ht 5' 2\" (1.575 m)   Wt 117 lb (53.1 kg)   BMI 21.40 kg/m² Appearance: Alert, well appearing and pleasant patient who is in no distress, oriented to person, place/time, and who follows commands. This patient is accompanied in the       office by her  self and daughter. Psychiatric: Affect and mood are appropriate. No dementia noted on examination  Musculoskeletal:  LOCATION: There is tenderness to palpation at the plantar heel foot - left      Integumentary: No rashes, skin patches, wounds, or abrasions to the right or left legs       Warm and normal color. No regions of expressible drainage. Gait: Normal      Tenderness: No tenderness        Motor/Strength/Tone Exam: Normal       Sensory Exam:   Intact Normal Sensation to ankle/foot      Stability Testing: No anterolateral or varus instability of the Ankle or Subtalar Joints               No peroneal tendon instability noted      ROM: Normal ROM noted to ankle/foot      Contractures: No Achilles or Gastrocnemius Contractures      Calf tenderness: Absent for calf or gastrocnemius muscle regions       Soft, supple, non tender, non taut lower extremity compartment  Alignment:      NEUTRAL Hindfoot,         none Metatarsus Adductus Metatarsus   Wounds/Abrasions:    None present  Extremities:   No embolic phenomena to the toes          No significant edema to the foot and or toes. Lower extremities are warm and appear well perfused    DVT: No evidence of DVT seen on examination at this time     No calf swelling, no tenderness to calf muscles  Lymphatic:  No Evidence of Lymphedema  Vascular: Medial Border of Tibia Region: Edema is not present         Pulses: Dorsalis Pedis &  Posterior Tibial Pulses : Palpable yes        Varicosities Lower Limbs :  None  noted  Neuro: Negative bilateral Straight leg raise (seated position)    See Musculoskeletal section for pertinent individual extremity examination    No abnormal hand/wrist, foot/ankle, or facial/neck tremors.     Lower Extremity/Ankle/Foot:  Mild antalgic gait, satisfactory weightbearing stance. Left lower extremity/ankle: There is no malalignment or deformity, there is little bit of weakness for dorsiflexion about 4+/5 compared to contralateral 5/5, plantarflexion strength about 4+/5 as well. No tenderness to palpation, no swelling. Left foot: Still diffuse tenderness to the plantar heel without swelling, no ecchymosis, erythema, fluctuance. Negative calcaneal squeeze test.    Contralateral lower extremity/ankle /foot exam:  Nontender, no swelling ligaments grossly stable. Normal weightbearing stance. Neurovascular exam grossly intact. Imaging: We did not get x-rays today, previous x-rays have been negative. An electronic signature was used to authenticate this note.   -- Anju Ramsey MD

## 2022-12-02 NOTE — LETTER
12/6/2022    Patient: Elroy Goode   YOB: 1935   Date of Visit: 12/2/2022     Charlee Lucas MD  [address] [address]  Via Fax: [phone]    Dear Charlee Lucas MD,      Thank you for referring Ms. Grady Hernandez to [facility] for evaluation. My notes for this consultation are attached. If you have questions, please do not hesitate to call me. I look forward to following your patient along with you.       Sincerely,    Elza Kenyon MD

## 2022-12-09 ENCOUNTER — OFFICE VISIT (OUTPATIENT)
Dept: ORTHOPEDIC SURGERY | Age: 87
End: 2022-12-09
Payer: MEDICARE

## 2022-12-09 DIAGNOSIS — R26.89 UNSTABLE BALANCE: ICD-10-CM

## 2022-12-09 DIAGNOSIS — R26.2 DIFFICULTY WALKING: Primary | ICD-10-CM

## 2022-12-09 NOTE — PROGRESS NOTES
PT DAILY TREATMENT NOTE    Patient Name: Naseem Dumont  Date:2022  : 1935  [x]  Patient  Verified  Payor: Vivian Garcia / Plan: [place] / Product Type: Medicare /    Total Treatment Time (min): 45  Total Treatment Time 1:1 (min): 30  Referring Provider: Jil Cunha MD    Treatment Area: L foot    SUBJECTIVE  Subjective functional status/changes:   [] No changes reported  Saw Dr. Kyle Hernandez recently and referred her to a back specialist as she does not feel her foot pain is related to planter fasciitis. OBJECTIVE    Neuromuscular Re-education: 40 (minutes: 30 one-one-one throughout)    PT Exercise Log        Activity/Exercise Date  22    Activity/Exercise      Nustep L2 10 min. Slantboard (Therex)   X       Tandem stance with gait  belt X     Forward step overs X     Lateral step overs X   Balance board with gaitbelt   X     TB PF/DF (Therex) X     Lateral Stepovers on foam X     Step Ups- Fwd and Lat X     LP- 90# (Therex) X     Mini-squats X   SAQs 3LBS    SKTC     PPT      ASSESSMENT  []  See Plan of Care  []  See progress note/recertification  [x]  Patient will continue to benefit from skilled therapy to address remaining functional deficits: prolonged walking. Much improved balance with lateral stepping and tandem stance. She is following up with spine team in January regarding her continuing left foot pain/weakness. ICD-10-CM ICD-9-CM    1. Difficulty walking  R26.2 719.7       2.  Unstable balance  R26.89 781.2         Progress towards goals / Updated goals:    PLAN  [x]  Upgrade activities as tolerated     [x]  Continue plan of care  []  Discharge due to:_  [] Other:_      Magda Giron, PT 2022  1:57 PM

## 2022-12-15 ENCOUNTER — OFFICE VISIT (OUTPATIENT)
Dept: ORTHOPEDIC SURGERY | Age: 87
End: 2022-12-15
Payer: MEDICARE

## 2022-12-15 DIAGNOSIS — R26.89 UNSTABLE BALANCE: ICD-10-CM

## 2022-12-15 DIAGNOSIS — R26.2 DIFFICULTY WALKING: Primary | ICD-10-CM

## 2022-12-15 NOTE — PROGRESS NOTES
PT DAILY TREATMENT NOTE    Patient Name: Sona Rodriguez  Date:12/15/2022  : 1935  [x]  Patient  Verified  Payor: Jessica Goodman / Plan: [place] MEDICARE PART A & B / Product Type: Medicare /    Total Treatment Time (min): 30, reduced due to patient's schedule  Total Treatment Time 1:1 (min): 23  Referring Provider: Cami Coughlin MD    Treatment Area: L foot    SUBJECTIVE  Subjective functional status/changes:   [] No changes reported  Feels her balance is improving some    OBJECTIVE    Neuromuscular Re-education: 40 (minutes: 23 one-one-one throughout)    PT Exercise Log        Activity/Exercise Date  12/15/22    Activity/Exercise      Nustep L2 10 min. Slantboard (Therex)   X       Tandem stance with gait  belt X     Forward step overs X     Lateral step overs X   Balance board with gaitbelt   X     TB PF/DF (Therex) X     Lateral Stepovers on foam X     Step Ups- Fwd and Lat X     LP- 90# (Therex) X     Mini-squats X   SAQs 3LBS    SKTC     PPT      ASSESSMENT  []  See Plan of Care  []  See progress note/recertification  [x]  Patient will continue to benefit from skilled therapy to address remaining functional deficits: prolonged walking. Continued improvement balance activities on uneven surfaces. Seems more comfortable/demonstrating better gait mechanics with her cane      ICD-10-CM ICD-9-CM    1. Difficulty walking  R26.2 719.7       2.  Unstable balance  R26.89 781.2         Progress towards goals / Updated goals:    PLAN  [x]  Upgrade activities as tolerated     [x]  Continue plan of care  []  Discharge due to:_  [] Other:_      Anthony Ibrahim, PT 12/15/2022  1:57 PM

## 2022-12-22 ENCOUNTER — OFFICE VISIT (OUTPATIENT)
Dept: ORTHOPEDIC SURGERY | Age: 87
End: 2022-12-22
Payer: MEDICARE

## 2022-12-22 DIAGNOSIS — R26.89 UNSTABLE BALANCE: ICD-10-CM

## 2022-12-22 DIAGNOSIS — R26.2 DIFFICULTY WALKING: Primary | ICD-10-CM

## 2023-01-13 ENCOUNTER — OFFICE VISIT (OUTPATIENT)
Dept: ORTHOPEDIC SURGERY | Age: 88
End: 2023-01-13
Payer: MEDICARE

## 2023-01-13 DIAGNOSIS — M79.672 PAIN OF LEFT HEEL: Primary | ICD-10-CM

## 2023-01-13 DIAGNOSIS — R26.2 DIFFICULTY WALKING: ICD-10-CM

## 2023-01-13 PROCEDURE — 97110 THERAPEUTIC EXERCISES: CPT | Performed by: PHYSICAL THERAPIST

## 2023-01-13 PROCEDURE — 97112 NEUROMUSCULAR REEDUCATION: CPT | Performed by: PHYSICAL THERAPIST

## 2023-01-16 NOTE — PROGRESS NOTES
PT DAILY TREATMENT NOTE    Patient Name: Robin Day  Date:2023  : 1935  [x]  Patient  Verified  Payor: Gina Lomeli / Plan: [place] MED[place]ARE PART A [place] B / Product Type: Medicare /    Total Treatment Time (min): 30  Total Treatment Time 1:1 (min): 30  Referring Provider: Jackie Dorman MD    Treatment Area: L foot    SUBJECTIVE  Subjective functional status/changes:   [] No changes reported  Patient states that she fell since last visit. She was trying to change a light bulb and put her foot into the sink and fell. She fell onto her chest.  She had x-rays that revealed no fractures. OBJECTIVE    Neuromuscular Re-education: 15 minutes one-on-one    Therapeutic exercises: one-on-one 15 min. PT Exercise Log        Activity/Exercise Date  23    Activity/Exercise      Nustep L2 10 min. Slantboard (Therex)   X       Tandem stance with gait  belt X     Forward step overs      Lateral step overs  X   Balance board with gaitbelt   X     TB PF/DF (Therex)      Lateral Stepovers on foam X     Step Ups- Fwd and Lat      LP- 90# (Therex) X     Mini-squats X   SAQs 3LBS    SKTC     PPT      ASSESSMENT  []  See Plan of Care  []  See progress note/recertification  [x]  Patient will continue to benefit from skilled therapy to address remaining functional deficits: prolonged walking. Patient did experience a fall since last visit. I do believe however that her fall is more related to safety awareness than lack of progress in balance since initiating therapy. Patient is now able to demonstrate nonantalgic gait pattern with equal step lengths with use of SPC. I would recommend transitioning to independent HEP for continued lower extremity strengthening in the next 2 weeks. ICD-10-CM ICD-9-CM    1. Pain of left heel  M79.672 729.5       2.  Difficulty walking  R26.2 719.7         Progress towards goals / Updated goals:    PLAN  [x]  Upgrade activities as tolerated     [x]  Continue plan of care  [] Discharge due to:_  [] Other:_      Albert Sorto, PT 1/16/2023  1:57 PM

## 2023-01-18 ENCOUNTER — OFFICE VISIT (OUTPATIENT)
Dept: ORTHOPEDIC SURGERY | Age: 88
End: 2023-01-18
Payer: MEDICARE

## 2023-01-18 VITALS — WEIGHT: 115 LBS | BODY MASS INDEX: 21.71 KG/M2 | HEIGHT: 61 IN

## 2023-01-18 DIAGNOSIS — G89.29 CHRONIC BILATERAL LOW BACK PAIN, UNSPECIFIED WHETHER SCIATICA PRESENT: ICD-10-CM

## 2023-01-18 DIAGNOSIS — M51.36 DDD (DEGENERATIVE DISC DISEASE), LUMBAR: ICD-10-CM

## 2023-01-18 DIAGNOSIS — M79.672 LEFT FOOT PAIN: Primary | ICD-10-CM

## 2023-01-18 DIAGNOSIS — M54.50 CHRONIC BILATERAL LOW BACK PAIN, UNSPECIFIED WHETHER SCIATICA PRESENT: ICD-10-CM

## 2023-01-18 NOTE — PROGRESS NOTES
William Drew (: 1935) is a 82 y.o. female, patient, here for evaluation of the following chief complaint(s):  Back Pain (Low Back, Left Foot Drop)       ASSESSMENT/PLAN:  Below is the assessment and plan developed based on review of pertinent history, physical exam, labs, studies, and medications. Presents today for evaluation of chronic left foot pain, which is not responded to physical therapy and injection by Dr. Estela Duvall. Radiologic findings reviewed in detail with the patient and her daughter. She does have some weakness as noted on physical exam below. She will continue with over-the-counter medications. We will get a lumbar MRI to evaluate for compressive pathology. She will follow-up to discuss her results. 1. Left foot pain  -     XR SPINE LUMB MIN 4 V; Future  -     MRI LUMB SPINE WO CONT; Future  2. Chronic bilateral low back pain, unspecified whether sciatica present  3. DDD (degenerative disc disease), lumbar      No follow-ups on file. SUBJECTIVE/OBJECTIVE:  William Drew (: 1935) is a 82 y.o. female. No flowsheet data found. Patient presents today on referral from Dr. Estela Duvall for evaluation of chronic left foot pain. She has previously had extensive conservative management to include medications and physical therapy. She was previously diagnosed with plantar fasciitis, and had an injection. Today, she also reports left foot weakness. She states that with sitting for prolonged periods of time she intermittently gets pain radiating down the posterior aspect of her left lower extremity. She reports intermittent chronic low back pain. No saddle anesthesia. No acute changes in bowel or bladder control. She takes Advil for pain management. Imaging:    XR Results (most recent):  Results from Appointment encounter on 23    XR SPINE LUMB MIN 4 V    Narrative  AP, lateral, flexion and extension views of the lumbar spine reveal mild degenerative scoliosis. Multilevel moderate disc degeneration and spondylosis with anteriolisthesis at L4-5 measuring approximately 3 mm, this is relatively stable in flexion and extension. No obvious acute fractures or lytic lesions noted. MRI Results (most recent):  No results found for this or any previous visit. No Known Allergies    Current Outpatient Medications   Medication Sig    escitalopram oxalate (LEXAPRO) 10 mg tablet Take 10 mg by mouth daily. buPROPion XL (WELLBUTRIN XL) 150 mg tablet Take 150 mg by mouth daily. clonazePAM (KlonoPIN) 0.5 mg tablet Take 0.25 mg by mouth nightly. No current facility-administered medications for this visit. Past Medical History:   Diagnosis Date    Fracture of left olecranon process 02/2020        History reviewed. No pertinent surgical history. History reviewed. No pertinent family history. Social History     Tobacco Use    Smoking status: Never    Smokeless tobacco: Never   Substance Use Topics    Alcohol use: Yes     Alcohol/week: 14.0 standard drinks     Types: 14 Glasses of wine per week    Drug use: Never        Review of Systems   Constitutional:  Negative for chills and fever. Musculoskeletal:  Positive for back pain and gait problem. Neurological:  Positive for weakness. All other systems reviewed and are negative. Vitals:  Ht 5' 1\" (1.549 m)   Wt 115 lb (52.2 kg)   BMI 21.73 kg/m²    Body mass index is 21.73 kg/m². Physical Exam  Neurologic  Sensory  Light Touch - Intact - Globally. Overall Assessment of Muscle Strength and Tone reveals  Lower Extremities - Right Iliopsoas - 5/5. Left Iliopsoas - 5/5. Right Tibialis Anterior - 5/5. Left Tibialis Anterior - 4/5. Right Gastroc-Soleus - 5/5. Left Gastroc-Soleus - 5/5. Right EHL - 5/5. Left EHL - 4/5. General Assessment of Reflexes  Right Ankle - Clonus is not present. Left Ankle - Clonus is not present.   Reflexes (Dermatomes)  2/2 Normal - Left Achilles (L5-S2), Left Knee (L2-4), Right Achilles (L5-S2) and Right Knee (L2-4). Musculoskeletal  Global Assessment  Examination of related systems reveals - well-developed, well-nourished, in no acute distress, alert and oriented x 3. Gait and Station - normal gait and station and normal posture. Right Lower Extremity - normal strength and tone, normal range of motion without pain and no instability, subluxation or laxity. Left Lower Extremity - normal strength and tone, normal range of motion without pain and no instability, subluxation or laxity. Spine/Ribs/Pelvis  Cervical Spine - Examination of the cervical spine reveals - no tenderness to palpation, no pain, no swelling, edema or erythema, normal cervical spine movements and normal sensation. Thoracic (Dorsal) Spine - Examination of the thoracic spine reveals - no tenderness over thoracic vertebrae, no pain, normal sensation and normal thoracic spine movements. Lumbosacral Spine - Examination of the lumbosacral spine reveals - no known fractures or deformities. Inspection and Palpation - Tenderness - moderate. Assessment of pain reveals the following findings - The pain is characterized as - moderate. Location - pain refers to lower back bilaterally. ROJM - Trunk Extension - 15 degrees. Lumbar Spine Flexion - 35 °. Lumbosacral Spine - Functional Testing - Babinski Test negative, Prone Knee Bending Test negative, Slump Test negative, Straight Leg Raising Test negative. Dr. Gurney Runner was available for immediate consult during this encounter. An electronic signature was used to authenticate this note.   -- LISANDRA Valencia

## 2023-01-18 NOTE — PROGRESS NOTES
1. Have you been to the ER, urgent care clinic since your last visit? Hospitalized since your last visit? No    2. Have you seen or consulted any other health care providers outside of the [address] [place] [address] since your last visit? Include any pap smears or colon screening.  No    Chief Complaint   Patient presents with    Back Pain     Low Back, Left Foot Drop

## 2023-01-26 ENCOUNTER — OFFICE VISIT (OUTPATIENT)
Dept: ORTHOPEDIC SURGERY | Age: 88
End: 2023-01-26
Payer: MEDICARE

## 2023-01-26 DIAGNOSIS — G89.29 CHRONIC BILATERAL LOW BACK PAIN, UNSPECIFIED WHETHER SCIATICA PRESENT: ICD-10-CM

## 2023-01-26 DIAGNOSIS — M51.36 DDD (DEGENERATIVE DISC DISEASE), LUMBAR: ICD-10-CM

## 2023-01-26 DIAGNOSIS — M54.50 CHRONIC BILATERAL LOW BACK PAIN, UNSPECIFIED WHETHER SCIATICA PRESENT: ICD-10-CM

## 2023-01-26 DIAGNOSIS — M79.672 LEFT FOOT PAIN: Primary | ICD-10-CM

## 2023-01-26 NOTE — PROGRESS NOTES
PT DAILY TREATMENT NOTE    Patient Name: Javad Villalta  Date:2023  : 1935  [x]  Patient  Verified  Payor: Yumikojamison Connolly / Plan: [place] MEDICARE PART A & B / Product Type: Medicare /    Total Treatment Time (min): 30  Total Treatment Time 1:1 (min): 30  Referring Provider: Filomena Fajardo MD    Treatment Area: L foot    SUBJECTIVE  Subjective functional status/changes:   [] No changes reported  Patient is doing pretty well. She is planning to have MRI of her lumbar spine. OBJECTIVE    Neuromuscular Re-education: 15 minutes one-on-one    Therapeutic exercises: one-on-one 15 min. PT Exercise Log        Activity/Exercise Date  23    Activity/Exercise      Nustep L2 10 min. Slantboard (Therex)   X       Tandem stance with gait  belt X     Forward step overs      Lateral step overs  X   Balance board with gaitbelt   X     TB PF/DF (Therex)      Lateral Stepovers on foam X     Step Ups- Fwd and Lat      LP- 90# (Therex) X     Mini-squats X   SAQs 3LBS    SKTC     PPT      ASSESSMENT  [x]  See Plan of Care  []  See progress note/recertification  []  Patient will continue to benefit from skilled therapy to address remaining functional deficits: prolonged walking. Patient has been participating in PT sporadically for 6+ months now. We initially treated her for plantar fasciitis, but is still having some weakness in her foot. She is going to have MRI of lumbar spine in the near future. I feel we have hit a plateau recently in terms of strengthening. She reports compliance with home exercises. I feel it is appropriate to her discharge her home program at this time but we will see her back in the future if needed. ICD-10-CM ICD-9-CM    1. Left foot pain  M79.672 729.5       2. Chronic bilateral low back pain, unspecified whether sciatica present  M54.50 724.2     G89.29 338.29       3. DDD (degenerative disc disease), lumbar  M51.36 722. 52         Progress towards goals / Updated goals:     PLAN  []  Upgrade activities as tolerated     []  Continue plan of care  [x]  Discharge   [] Other:_      Andrea England, PT 1/26/2023  1:57 PM

## 2023-03-02 ENCOUNTER — OFFICE VISIT (OUTPATIENT)
Dept: ORTHOPEDIC SURGERY | Age: 88
End: 2023-03-02

## 2023-03-02 VITALS — HEIGHT: 61 IN | WEIGHT: 115 LBS | BODY MASS INDEX: 21.71 KG/M2

## 2023-03-02 DIAGNOSIS — M51.36 DDD (DEGENERATIVE DISC DISEASE), LUMBAR: ICD-10-CM

## 2023-03-02 DIAGNOSIS — R26.2 DIFFICULTY WALKING: Primary | ICD-10-CM

## 2023-03-02 DIAGNOSIS — M54.50 CHRONIC BILATERAL LOW BACK PAIN, UNSPECIFIED WHETHER SCIATICA PRESENT: ICD-10-CM

## 2023-03-02 DIAGNOSIS — R26.89 UNSTABLE BALANCE: ICD-10-CM

## 2023-03-02 DIAGNOSIS — G89.29 CHRONIC BILATERAL LOW BACK PAIN, UNSPECIFIED WHETHER SCIATICA PRESENT: ICD-10-CM

## 2023-03-02 RX ORDER — CETIRIZINE HYDROCHLORIDE 10 MG/1
10 TABLET ORAL DAILY
COMMUNITY

## 2023-03-02 RX ORDER — CALCIUM CARBONATE/VITAMIN D3 600MG-5MCG
TABLET ORAL
COMMUNITY
Start: 2013-04-22

## 2023-03-02 RX ORDER — NICOTINE POLACRILEX 2 MG
GUM BUCCAL
COMMUNITY

## 2023-03-02 RX ORDER — IBUPROFEN 200 MG
200 TABLET ORAL
COMMUNITY

## 2023-03-02 NOTE — PATIENT INSTRUCTIONS
Spondylolysis and Spondylolisthesis: Exercises  Introduction  Here are some examples of exercises for you to try. The exercises may be suggested for a condition or for rehabilitation. Start each exercise slowly. Ease off the exercises if you start to have pain. You will be told when to start these exercises and which ones will work best for you. How to do the exercises  Single knee-to-chest  Lie on your back with your knees bent and your feet flat on the floor. You can put a small pillow under your head and neck if it is more comfortable. Bring one knee to your chest, keeping the other foot flat on the floor. Keep your lower back pressed to the floor. Hold for 15 to 30 seconds. Relax, and lower the knee to the starting position. Repeat with the other leg. Repeat 2 to 4 times with each leg. To get more stretch, put your other leg flat on the floor while pulling your knee to your chest.  Double knee-to-chest  Lie on your back with your knees bent and your feet flat on the floor. You can put a small pillow under your head and neck if it is more comfortable. Bring both knees to your chest.  Keep your lower back pressed to the floor. Hold for 15 to 30 seconds. Relax, and lower your knees to the starting position. Repeat 2 to 4 times. Alternate arm and leg (bird dog) exercise  Do this exercise slowly. Try to keep your body straight at all times. Start on the floor, on your hands and knees. Tighten your belly muscles by pulling your belly button in toward your spine. Be sure you continue to breathe normally and do not hold your breath. Raise one arm off the floor, and hold it straight out in front of you. Be careful not to let your shoulder drop down, because that will twist your trunk. Hold for about 6 seconds, then lower your arm and switch to your other arm. Repeat 8 to 12 times on each arm. When you can do this exercise with ease and no pain, repeat steps 1 through 5.  But this time do it with one leg raised off the floor, holding your leg straight out behind you. Be careful not to let your hip drop down, because that will twist your trunk. When holding your leg straight out becomes easier, try raising your opposite arm at the same time, and repeat steps 1 through 5. Bridging  Lie on your back with both knees bent. Your knees should be bent about 90 degrees. Then push your feet into the floor, squeeze your buttocks, and lift your hips off the floor until your shoulders, hips, and knees are all in a straight line. Hold for about 6 seconds as you continue to breathe normally, and then slowly lower your hips back down to the floor and rest for up to 10 seconds. Repeat 8 to 12 times. Curl-ups  Lie on the floor on your back with your knees bent at a 90-degree angle. Your feet should be flat on the floor, about 12 inches from your buttocks. Cross your arms over your chest. If this bothers your neck, try putting your hands behind your neck (not your head), with your elbows spread apart. Slowly tighten your belly muscles and raise your shoulder blades off the floor. Keep your head in line with your body, and do not press your chin to your chest.  Hold this position for 1 or 2 seconds, then slowly lower yourself back down to the floor. Repeat 8 to 12 times. Plank  Do this exercise slowly. Try to keep your body straight at all times, and do not let one hip drop lower than the other. Lie on your stomach, resting your upper body on your forearms. Tighten your belly muscles by pulling your belly button in toward your spine. Keeping your knees on the floor, press down with your forearms to lift your upper body off the floor. Hold for about 6 seconds, then lower your body to the floor. Rest for up to 10 seconds. Repeat 8 to 12 times. Over time, work up to holding for 15 to 30 seconds each time.   If this exercise is easy to do with your knees on the floor, try doing this exercise with your knees and legs straight, supported by your toes on the floor. Follow-up care is a key part of your treatment and safety. Be sure to make and go to all appointments, and call your doctor if you are having problems. It's also a good idea to know your test results and keep a list of the medicines you take. Current as of: March 9, 2022               Content Version: 13.4  © 2006-2022 [employer], [employer]. Care instructions adapted under license by [employer] (which disclaims liability or warranty for this information). If you have questions about a medical condition or this instruction, always ask your healthcare professional. [place] [address] any warranty or liability for your use of this information.

## 2023-03-02 NOTE — PROGRESS NOTES
Pushpa Sharp (: 1935) is a 82 y.o. female, patient, here for evaluation of the following chief complaint(s):  LOW BACK PAIN       ASSESSMENT/PLAN:    Below is the assessment and plan developed based on review of pertinent history, physical exam, labs, studies, and medications. We reviewed the imaging today. She has a mild grade 1 anterolisthesis at L4-5 as well as some mild gnosis in the lateral recess due to the facet hypertrophy and the anterolisthesis. 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 Obviously given her age would like to avoid surgical intervention. [facility] Doctor send her for round of bilateral epidurals at L4-5. I am also going to send her to the gait center for her ambulation. 1. Difficulty walking  -     REFERRAL TO PHYSICAL THERAPY  -     REFERRAL TO PAIN MANAGEMENT  2. Unstable balance  -     REFERRAL TO PHYSICAL THERAPY  -     REFERRAL TO PAIN MANAGEMENT  3. DDD (degenerative disc disease), lumbar  -     REFERRAL TO PHYSICAL THERAPY  -     REFERRAL TO PAIN MANAGEMENT  4. Chronic bilateral low back pain, unspecified whether sciatica present  -     REFERRAL TO PHYSICAL THERAPY  -     REFERRAL TO PAIN MANAGEMENT      No follow-ups on file. SUBJECTIVE/OBJECTIVE:  Pushpa Sharp (: 1935) is a 82 y.o. female. Pain Assessment  3/2/2023   Location of Pain Back   Severity of Pain 0        She comes in today for an evaluation on self-referral for chronic lower back pain and with a difficult gait. She feels like she is limping and does not have a fluid gait. She has some back pain occasionally but it is intermittent. She does have a decreased walking tolerance. She has no bowel bladder difficulties. She has had physical therapy specifically for the lower back. Minimal back pain today. She does not take any medications.   He does not keep her up at night.    Imaging:    XR Results (most recent):  Results from Appointment encounter on 01/18/23    XR SPINE LUMB MIN 4 V    Narrative  AP, lateral, flexion and extension views of the lumbar spine reveal mild degenerative scoliosis. Multilevel moderate disc degeneration and spondylosis with anteriolisthesis at L4-5 measuring approximately 3 mm, this is relatively stable in flexion and extension. No obvious acute fractures or lytic lesions noted. MRI Results (most recent): MRI Results (most recent):  Results from Appointment encounter on 02/07/23    MRI LUMB SPINE WO CONT    Narrative  MRI OF THE LUMBAR SPINE WITHOUT CONTRAST: 2/7/2023 1:10 PM    INDICATION:  Left foot pain. HISTORY (per patient via MRI worksheet): Leg weakness. TECHNIQUE: Multiplanar and multisequence MRI was obtained of the lumbar spine  without contrast.    COMPARISON: Lumbar radiographs 1/18/2023. FINDINGS:  Lumbar alignment is normal. There is a chronic L4 compression fracture, with  approximately 25% vertebral body height loss. There is heterogeneous fatty  marrow infiltration (osteopenia). There is mild degenerative endplate edema at  G5-M7. The visualized cord is normal in caliber and signal intensity. The conus  medullaris terminates at the T12-L1 level. Incidental note is made of  gallstones. Minimal disc bulges T9-T10 and T10-T11, visible only on sagittal images. T12-L1: No stenosis. Minimal disc bulge and minimal facet osteoarthritis. L1-L2: Loss of disc height and facet osteoarthritis cause mild bilateral neural  foraminal stenosis. Mild disc bulge. L2-L3: A mild disc bulge and mild facet osteoarthritis cause mild canal and mild  bilateral neural foraminal stenosis. L3-L4: A mild disc bulge and facet osteoarthritis cause minimal canal narrowing  and mild bilateral neural foraminal stenosis.   L4-L5: A mild disc bulge and facet osteoarthritis cause mild canal stenosis and  mild right neural foraminal stenosis. L5-S1: A mild disc bulge causes minimal canal narrowing. Facet osteoarthritis. Impression  1. Mild degenerative disease. 2. Chronic L4 compression fracture. No Known Allergies    Current Outpatient Medications   Medication Sig    ibuprofen (MOTRIN) 200 mg tablet Take 200 mg by mouth every six (6) hours as needed. cetirizine (ZYRTEC) 10 mg tablet Take 10 mg by mouth daily. biotin 1 mg cap biotin    calcium-vitamin D 600 mg-5 mcg (200 unit) tab Calcium 600 + D(3) 600 mg (1,500 mg)-200 unit tablet   Prescribed by non [facility] MD    vitamin e 600 unit capsule Take 600 Units by mouth daily. escitalopram oxalate (LEXAPRO) 10 mg tablet Take 10 mg by mouth daily. buPROPion XL (WELLBUTRIN XL) 150 mg tablet Take 150 mg by mouth daily. clonazePAM (KlonoPIN) 0.5 mg tablet Take 0.25 mg by mouth nightly. No current facility-administered medications for this visit. Past Medical History:   Diagnosis Date    Fracture of left olecranon process 02/2020        History reviewed. No pertinent surgical history. History reviewed. No pertinent family history. Social History     Tobacco Use    Smoking status: Never    Smokeless tobacco: Never   Substance Use Topics    Alcohol use: Yes     Alcohol/week: 14.0 standard drinks     Types: 14 Glasses of wine per week    Drug use: Never        Review of Systems       Vitals:  Ht 5' 1\" (1.549 m)   Wt 115 lb (52.2 kg)   BMI 21.73 kg/m²    Body mass index is 21.73 kg/m². Ortho Exam       Alert and Independence  x 3    Normal gait and station; normal posture. No antalgia noted. No Trendelenburg gait. Mild shuffling noted. No assistive devices today. She does have a cane    Cervical spine:  Examination of the cervical spine demonstrates no tenderness on palpation, no pain, no swelling or edema, with normal lumbar range of motion.     Thoracic spine: Examination of the thoracic spine demonstrates no tenderness on palpation, no pain, no swelling or edema. Normal sensation and range of motion. Lumbar spine:     Examination of the lumbar spine demonstrates on inspection: No abnormal cutaneous markings. Mild straightening lumbar lordosis. No surgical incisions noted. On palpation: No tenderness on palpation. No step-offs noted. No masses noted. Range of motion: Fairly maintained range of motion. Lumbar flexion extension limited. Motor examination:  Right iliopsoas 5/5,  left iliopsoas 5/5, right quad 5/5, left quad 5/5, right anterior tibialis 5/5, left anterior tibialis 5/5, right EHL 5/5, left EHL 5/5, right gastrocnemius 5/5 , left gastrocnemius 5/5    Sensory examination: Reveals deficits    Reflexes: Left knee 2/2, right knee 2/2, right ankle 2/2, left ankle 2/2    Functional testing: Straight leg test negative bilaterally; bowstring sign negative bilaterally    Babinsksi and Clonus and negative bilaterally. An electronic signature was used to authenticate this note.   -- Mckenzie Munoz MD

## 2023-03-02 NOTE — LETTER
3/2/2023    Patient: Nithya Barragan   YOB: 1935   Date of Visit: 3/2/2023     Amaury Gamez MD  [address] [address]  Via Fax: [phone]    Dear Amaury Gamez MD,      Thank you for referring Ms. Kaye Cuadra to [facility] for evaluation. My notes for this consultation are attached. If you have questions, please do not hesitate to call me. I look forward to following your patient along with you.       Sincerely,    Steph Bush MD

## 2023-07-31 ENCOUNTER — HOSPITAL ENCOUNTER (EMERGENCY)
Facility: HOSPITAL | Age: 88
Discharge: HOME | End: 2023-07-31
Attending: EMERGENCY MEDICINE
Payer: MEDICARE

## 2023-07-31 ENCOUNTER — HOSPITAL ENCOUNTER (EMERGENCY)
Facility: HOSPITAL | Age: 88
Discharge: HOME | End: 2023-08-03
Payer: MEDICARE

## 2023-07-31 VITALS
SYSTOLIC BLOOD PRESSURE: 169 MMHG | DIASTOLIC BLOOD PRESSURE: 94 MMHG | RESPIRATION RATE: 16 BRPM | HEART RATE: 67 BPM | TEMPERATURE: 97.7 F | OXYGEN SATURATION: 97 %

## 2023-07-31 DIAGNOSIS — S09.90XA INJURY OF HEAD, INITIAL ENCOUNTER: Primary | ICD-10-CM

## 2023-07-31 DIAGNOSIS — S01.01XA LACERATION OF SCALP, INITIAL ENCOUNTER: ICD-10-CM

## 2023-07-31 PROCEDURE — 72125 CT NECK SPINE W/O DYE: CPT

## 2023-07-31 PROCEDURE — 70450 CT HEAD/BRAIN W/O DYE: CPT

## 2023-07-31 PROCEDURE — 99284 EMERGENCY DEPT VISIT MOD MDM: CPT

## 2023-07-31 ASSESSMENT — PAIN - FUNCTIONAL ASSESSMENT: PAIN_FUNCTIONAL_ASSESSMENT: NONE - DENIES PAIN

## 2023-07-31 NOTE — ED PROVIDER NOTES
[facility] EMERGENCY DEP  EMERGENCY DEPARTMENT ENCOUNTER      Pt Name: Jessie Gill  MRN: [id number]  [address] [facility] [place] 1935  Date of evaluation: 7/31/2023  Provider: Henrqiue Lares, [address] [facility]       Chief Complaint   Patient presents with    Fall         HISTORY OF PRESENT ILLNESS   (Location/Symptom, Timing/Onset, Context/Setting, Quality, Duration, Modifying Factors, Severity)  Note limiting factors. 33-year-old female with no significant past medical history presents to ED with concerns of fall. Patient reports around 1 PM today she lost her balance and tripped over something, causing her to fall and hit her head on the carpet. Patient presents with her daughter who witnessed the fall and reports that patient has history of similar falls and that it seems to be mechanical in nature (patient's daughter is a nurse practitioner). Patient sustained a small laceration to the back of her head [facility] she come to the ED for CT scan. She denies LOC and is not on blood thinner. Denies any headache, vision changes, nausea, vomiting, numbness or tingling. The history is provided by the patient and a relative. Review of External Medical Records:     Nursing Notes were reviewed. REVIEW OF SYSTEMS    (2-9 systems for level 4, 10 or more for level 5)     Review of Systems   Constitutional:  Negative for chills and fever. HENT:  Negative for congestion, ear pain and sore throat. Eyes:  Negative for pain. Respiratory:  Negative for cough and shortness of breath. Cardiovascular:  Negative for chest pain. Gastrointestinal:  Negative for abdominal pain, diarrhea, nausea and vomiting. Genitourinary:  Negative for dysuria and flank pain. Musculoskeletal:  Negative for myalgias. Skin:  Positive for wound. Negative for rash. Neurological:  Negative for dizziness and headaches. Hematological:  Negative for adenopathy.      Except as noted above the

## 2023-07-31 NOTE — ED TRIAGE NOTES
Patient lost her balance and fell at 1pm today, patient hit her head on carpet. Patient reports history of balance issues.  No LOC not on a blood thinner

## 2023-08-01 ASSESSMENT — ENCOUNTER SYMPTOMS
NAUSEA: 0
DIARRHEA: 0
COUGH: 0
EYE PAIN: 0
SHORTNESS OF BREATH: 0
ABDOMINAL PAIN: 0
SORE THROAT: 0
VOMITING: 0

## 2023-08-01 NOTE — DISCHARGE INSTRUCTIONS
Continue to monitor symptoms at home. Take advil or tylenol as needed for pain. Get plenty of rest and avoid excessive screen time as it can exacerbate symptoms. Avoid strenuous physical activity or any activities that increase the risk for second head injury for next week. Follow up with PCP when you can. Return with any changes or worsening of symptoms. Should your school/ work need additional forms completed or need clarification on limitations please direct this to your PCP, we are not able to address this from the ER.  Thank you for your understanding

## (undated) DEVICE — BIPOLAR FORCEPS CORD: Brand: VALLEYLAB

## (undated) DEVICE — GARMENT,MEDLINE,DVT,INT,CALF,MED, GEN2: Brand: MEDLINE

## (undated) DEVICE — DRAPE,HAND,STERILE: Brand: MEDLINE

## (undated) DEVICE — TOWEL SURG W17XL27IN STD BLU COT NONFENESTRATED PREWASHED

## (undated) DEVICE — SOLUTION IV 1000ML 0.9% SOD CHL

## (undated) DEVICE — .045" X 6" ST GUIDE WIRE: Brand: ACUMED

## (undated) DEVICE — INFECTION CONTROL KIT SYS

## (undated) DEVICE — 2.0MM QUICK RELEASE DRILL: Brand: ACUMED

## (undated) DEVICE — SUTURE ETHLN SZ 4-0 L18IN NONABSORBABLE BLK L19MM PS-2 3/8 1667H

## (undated) DEVICE — PADDING CAST 4 INX5 YD STRL

## (undated) DEVICE — BLADE OPHTH MINI BEAV SHRP --

## (undated) DEVICE — DRAPE CARM MINI FOR IMAG SYS INSIGHT FLROSCN

## (undated) DEVICE — DRAPE,REIN 53X77,STERILE: Brand: MEDLINE

## (undated) DEVICE — BANDAGE,ELASTIC,ESMARK,STERILE,4"X9',LF: Brand: MEDLINE

## (undated) DEVICE — Z DISCONTINUED GLOVE SURG SZ 7.5 L12IN FNGR THK13MIL WHT ISOLEX

## (undated) DEVICE — (D)PREP SKN CHLRAPRP APPL 26ML -- CONVERT TO ITEM 371833

## (undated) DEVICE — BASIN SET MIN W/O CAUT PNCL --

## (undated) DEVICE — DRESSING,GAUZE,XEROFORM,CURAD,1"X8",ST: Brand: CURAD

## (undated) DEVICE — HANDLE LT SNAP ON ULT DURABLE LENS FOR TRUMPF ALC DISPOSABLE

## (undated) DEVICE — SPONGE GZ W4XL4IN COT 12 PLY TYP VII WVN C FLD DSGN

## (undated) DEVICE — 2.8MM QUICK RELEASE DRILL: Brand: ACUMED

## (undated) DEVICE — SUTURE VCRL SZ 3-0 L27IN ABSRB UD L26MM SH 1/2 CIR J416H

## (undated) DEVICE — PACK,BASIC,SIRUS,V: Brand: MEDLINE

## (undated) DEVICE — BNDG ELAS HK LOOP 4X5YD NS -- MATRIX

## (undated) DEVICE — DISPOSABLE TOURNIQUET CUFF SINGLE BLADDER, DUAL PORT AND QUICK CONNECT CONNECTOR: Brand: COLOR CUFF